# Patient Record
Sex: FEMALE | Race: ASIAN | NOT HISPANIC OR LATINO | Employment: FULL TIME | URBAN - METROPOLITAN AREA
[De-identification: names, ages, dates, MRNs, and addresses within clinical notes are randomized per-mention and may not be internally consistent; named-entity substitution may affect disease eponyms.]

---

## 2017-08-03 ENCOUNTER — OFFICE VISIT (OUTPATIENT)
Dept: HEMATOLOGY/ONCOLOGY | Facility: CLINIC | Age: 52
End: 2017-08-03
Payer: COMMERCIAL

## 2017-08-03 VITALS
DIASTOLIC BLOOD PRESSURE: 65 MMHG | BODY MASS INDEX: 26.09 KG/M2 | HEIGHT: 62 IN | WEIGHT: 141.81 LBS | RESPIRATION RATE: 18 BRPM | HEART RATE: 56 BPM | TEMPERATURE: 98 F | SYSTOLIC BLOOD PRESSURE: 108 MMHG

## 2017-08-03 DIAGNOSIS — C50.412 MALIGNANT NEOPLASM OF UPPER-OUTER QUADRANT OF LEFT BREAST IN FEMALE, ESTROGEN RECEPTOR NEGATIVE: Chronic | ICD-10-CM

## 2017-08-03 DIAGNOSIS — Z17.1 MALIGNANT NEOPLASM OF UPPER-OUTER QUADRANT OF LEFT BREAST IN FEMALE, ESTROGEN RECEPTOR NEGATIVE: Chronic | ICD-10-CM

## 2017-08-03 PROCEDURE — 99213 OFFICE O/P EST LOW 20 MIN: CPT | Mod: ,,, | Performed by: INTERNAL MEDICINE

## 2017-08-03 PROCEDURE — 3008F BODY MASS INDEX DOCD: CPT | Mod: ,,, | Performed by: INTERNAL MEDICINE

## 2017-08-03 NOTE — PROGRESS NOTES
"                                                         PROGRESS NOTE    Subjective:       Patient ID: Jordan Lion is a 52 y.o. female.  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     History of Present Illness:   Jordan Lion is a 52 y.o. female who presents for routine follow up of breast cancer.        Family and Social history reviewed and is unchanged from 3/30/2015.       ROS:  Review of Systems   Constitutional: Negative for fever.   Respiratory: Negative for shortness of breath.    Cardiovascular: Negative for chest pain and leg swelling.   Gastrointestinal: Negative for abdominal pain and blood in stool.   Genitourinary: Negative for hematuria.   Skin: Negative for rash.          Current Outpatient Prescriptions:     multivitamin (ONE DAILY MULTIVITAMIN) per tablet, Take 1 tablet by mouth once daily., Disp: , Rfl:     oxycodone-acetaminophen (PERCOCET) 5-325 mg per tablet, Take 2 tablets by mouth every 4 (four) hours as needed for Pain., Disp: 31 tablet, Rfl: 0    pantoprazole (PROTONIX) 40 MG tablet, Take 40 mg by mouth once daily., Disp: , Rfl:         Objective:       Physical Examination:     /65   Pulse (!) 56   Temp 98.4 °F (36.9 °C)   Resp 18   Ht 5' 2" (1.575 m)   Wt 64.3 kg (141 lb 12.8 oz)   BMI 25.94 kg/m²     Physical Exam   Constitutional: She appears well-developed and well-nourished.   HENT:   Head: Normocephalic and atraumatic.   Right Ear: External ear normal.   Left Ear: External ear normal.   Mouth/Throat: Oropharynx is clear and moist.   Eyes: Conjunctivae are normal. Pupils are equal, round, and reactive to light.   Neck: No tracheal deviation present. No thyromegaly present.   Cardiovascular: Normal rate, regular rhythm and normal heart sounds.    Pulmonary/Chest: Effort normal and breath sounds " normal.       Abdominal: Soft. Bowel sounds are normal. She exhibits no distension and no mass. There is no tenderness.   Musculoskeletal: She exhibits no edema.   Neurological:   Neuro intact througout   Skin: No rash noted.   Psychiatric: She has a normal mood and affect. Her behavior is normal. Judgment and thought content normal.       Labs:   No results found for this or any previous visit (from the past 336 hour(s)).  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  Exam is negative and pt.is INDIO.  Will continue every 6 month follow up for exams.               Other Visit Diagnoses    None.         Discussion:     Return in about 6 months (around 2/3/2018).      Electronically signed by Chase Colon

## 2017-08-03 NOTE — LETTER
August 3, 2017      Brayan Devlin MD  [address]  [place] [address]           [facility]  [address]  [address]  [place] [address]  Phone: [phone]  Fax: [phone]          Patient: Jordan Lion   MR Number: [id number]   YOB: 1965   Date of Visit: 8/3/2017       Dear Dr. Brayan Devlin:    Thank you for referring Jordan Lion to me for evaluation. Attached you will find relevant portions of my assessment and plan of care.    If you have questions, please do not hesitate to call me. I look forward to following Jordan Lion along with you.    Sincerely,    Chase Morales MD    Enclosure  CC:  No Recipients    If you would like to receive this communication electronically, please contact [email or web address] or [phone] to request more information on [employer] Link access.    For providers and/or their staff who would like to refer a patient to [facility], please contact us through our one-stop-shop provider referral line, [facility], at [phone].    If you feel you have received this communication in error or would no longer like to receive these types of communications, please e-mail [email or web address]

## 2017-08-03 NOTE — ASSESSMENT & PLAN NOTE
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  Exam is negative and pt.is INDIO.  Will continue every 6 month follow up for exams.

## 2018-02-05 ENCOUNTER — OFFICE VISIT (OUTPATIENT)
Dept: HEMATOLOGY/ONCOLOGY | Facility: CLINIC | Age: 53
End: 2018-02-05
Payer: COMMERCIAL

## 2018-02-05 VITALS
BODY MASS INDEX: 26.53 KG/M2 | DIASTOLIC BLOOD PRESSURE: 64 MMHG | TEMPERATURE: 98 F | WEIGHT: 144.19 LBS | HEIGHT: 62 IN | HEART RATE: 62 BPM | SYSTOLIC BLOOD PRESSURE: 92 MMHG | RESPIRATION RATE: 18 BRPM

## 2018-02-05 DIAGNOSIS — Z17.1 MALIGNANT NEOPLASM OF UPPER-OUTER QUADRANT OF LEFT BREAST IN FEMALE, ESTROGEN RECEPTOR NEGATIVE: Chronic | ICD-10-CM

## 2018-02-05 DIAGNOSIS — C50.412 MALIGNANT NEOPLASM OF UPPER-OUTER QUADRANT OF LEFT BREAST IN FEMALE, ESTROGEN RECEPTOR NEGATIVE: Chronic | ICD-10-CM

## 2018-02-05 PROCEDURE — 99213 OFFICE O/P EST LOW 20 MIN: CPT | Mod: ,,, | Performed by: INTERNAL MEDICINE

## 2018-02-05 PROCEDURE — 3008F BODY MASS INDEX DOCD: CPT | Mod: ,,, | Performed by: INTERNAL MEDICINE

## 2018-02-05 NOTE — PROGRESS NOTES
"                                                         PROGRESS NOTE    Subjective:       Patient ID: Jordan Lion is a 52 y.o. female.  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     History of Present Illness:   Jordan Lion is a 52 y.o. female who presents for routine follow up of breast cancer.    No new complaints today.     Family and Social history reviewed and is unchanged from 3/30/2015.       ROS:  Review of Systems   Constitutional: Negative for fever.   Respiratory: Negative for shortness of breath.    Cardiovascular: Negative for chest pain and leg swelling.   Gastrointestinal: Negative for abdominal pain and blood in stool.   Genitourinary: Negative for hematuria.   Skin: Negative for rash.          Current Outpatient Prescriptions:     multivitamin (ONE DAILY MULTIVITAMIN) per tablet, Take 1 tablet by mouth once daily., Disp: , Rfl:     oxycodone-acetaminophen (PERCOCET) 5-325 mg per tablet, Take 2 tablets by mouth every 4 (four) hours as needed for Pain., Disp: 31 tablet, Rfl: 0    pantoprazole (PROTONIX) 40 MG tablet, Take 40 mg by mouth once daily., Disp: , Rfl:         Objective:       Physical Examination:     BP 92/64   Pulse 62   Temp 97.9 °F (36.6 °C)   Resp 18   Ht 5' 2" (1.575 m)   Wt 65.4 kg (144 lb 3.2 oz)   BMI 26.37 kg/m²     Physical Exam   Constitutional: She appears well-developed and well-nourished.   HENT:   Head: Normocephalic and atraumatic.   Right Ear: External ear normal.   Left Ear: External ear normal.   Mouth/Throat: Oropharynx is clear and moist.   Eyes: Conjunctivae are normal. Pupils are equal, round, and reactive to light.   Neck: No tracheal deviation present. No thyromegaly present.   Cardiovascular: Normal rate, regular rhythm and normal heart sounds.    Pulmonary/Chest: Effort normal and breath " sounds normal.       Abdominal: Soft. Bowel sounds are normal. She exhibits no distension and no mass. There is no tenderness.   Musculoskeletal: She exhibits no edema.   Neurological:   Neuro intact througout   Skin: No rash noted.   Psychiatric: She has a normal mood and affect. Her behavior is normal. Judgment and thought content normal.       Labs:   No results found for this or any previous visit (from the past 336 hour(s)).  CMP  No results found for: NA, K, CL, CO2, GLU, BUN, CREATININE, CALCIUM, PROT, ALBUMIN, BILITOT, ALKPHOS, AST, ALT, ANIONGAP, ESTGFRAFRICA, EGFRNONAA  No results found for: CEA  No results found for: PSA        Assessment/Plan:     Problem List Items Addressed This Visit     Malignant neoplasm of upper-outer quadrant of left breast in female, estrogen receptor negative (Chronic)     Patient is doing well and is INDIO at this time.  She had high grade DCIS with suspicion for microinvasion.  Will continue with every six month follow up.                 Discussion:     Follow-up in about 6 months (around 8/5/2018).      Electronically signed by Chsae Colon

## 2018-02-05 NOTE — ASSESSMENT & PLAN NOTE
Patient is doing well and is INDIO at this time.  She had high grade DCIS with suspicion for microinvasion.  Will continue with every six month follow up.

## 2018-02-05 NOTE — LETTER
February 5, 2018      Brayan Devlin MD  [address]  [place] [address]           [facility]  [address]  [address]  [place] [address]  Phone: [phone]  Fax: [phone]          Patient: Jordan Lion   MR Number: [id number]   YOB: 1965   Date of Visit: 2/5/2018       Dear Dr. Brayan Devlin:    Thank you for referring Jordan Lion to me for evaluation. Attached you will find relevant portions of my assessment and plan of care.    If you have questions, please do not hesitate to call me. I look forward to following Jordan Lion along with you.    Sincerely,    Chase Morales MD    Enclosure  CC:  No Recipients    If you would like to receive this communication electronically, please contact [email or web address] or [phone] to request more information on [employer] Link access.    For providers and/or their staff who would like to refer a patient to [facility], please contact us through our one-stop-shop provider referral line, [facility], at [phone].    If you feel you have received this communication in error or would no longer like to receive these types of communications, please e-mail [email or web address]

## 2018-09-04 ENCOUNTER — OFFICE VISIT (OUTPATIENT)
Dept: HEMATOLOGY/ONCOLOGY | Facility: CLINIC | Age: 53
End: 2018-09-04
Payer: COMMERCIAL

## 2018-09-04 VITALS
BODY MASS INDEX: 26.13 KG/M2 | SYSTOLIC BLOOD PRESSURE: 102 MMHG | TEMPERATURE: 99 F | HEART RATE: 60 BPM | WEIGHT: 142 LBS | HEIGHT: 62 IN | DIASTOLIC BLOOD PRESSURE: 58 MMHG | RESPIRATION RATE: 18 BRPM

## 2018-09-04 DIAGNOSIS — Z17.1 MALIGNANT NEOPLASM OF UPPER-OUTER QUADRANT OF LEFT BREAST IN FEMALE, ESTROGEN RECEPTOR NEGATIVE: Chronic | ICD-10-CM

## 2018-09-04 DIAGNOSIS — C50.412 MALIGNANT NEOPLASM OF UPPER-OUTER QUADRANT OF LEFT BREAST IN FEMALE, ESTROGEN RECEPTOR NEGATIVE: Chronic | ICD-10-CM

## 2018-09-04 PROCEDURE — 3008F BODY MASS INDEX DOCD: CPT | Mod: ,,, | Performed by: INTERNAL MEDICINE

## 2018-09-04 PROCEDURE — 99213 OFFICE O/P EST LOW 20 MIN: CPT | Mod: ,,, | Performed by: INTERNAL MEDICINE

## 2018-09-04 NOTE — PROGRESS NOTES
"                                                         PROGRESS NOTE    Subjective:       Patient ID: Jordan Lion is a 53 y.o. female.  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  f/u breast cancer.     History of Present Illness:   Jordan Lion is a 53 y.o. female who presents for routine follow up of breast cancer.    No new complaints today. patient is feeling well.      Family and Social history reviewed and is unchanged from 3/30/2015.       ROS:  Review of Systems   Constitutional: Negative for fever.   Respiratory: Negative for shortness of breath.    Cardiovascular: Negative for chest pain and leg swelling.   Gastrointestinal: Negative for abdominal pain and blood in stool.   Genitourinary: Negative for hematuria.   Skin: Negative for rash.          Current Outpatient Medications:     multivitamin (ONE DAILY MULTIVITAMIN) per tablet, Take 1 tablet by mouth once daily., Disp: , Rfl:     oxycodone-acetaminophen (PERCOCET) 5-325 mg per tablet, Take 2 tablets by mouth every 4 (four) hours as needed for Pain., Disp: 31 tablet, Rfl: 0    pantoprazole (PROTONIX) 40 MG tablet, Take 40 mg by mouth once daily., Disp: , Rfl:         Objective:       Physical Examination:     BP (!) 102/58   Pulse 60   Temp 98.9 °F (37.2 °C) (Oral)   Resp 18   Ht 5' 2" (1.575 m)   Wt 64.4 kg (142 lb)   BMI 25.97 kg/m²     Physical Exam   Constitutional: She appears well-developed and well-nourished.   HENT:   Head: Normocephalic and atraumatic.   Right Ear: External ear normal.   Left Ear: External ear normal.   Mouth/Throat: Oropharynx is clear and moist.   Eyes: Conjunctivae are normal. Pupils are equal, round, and reactive to light.   Neck: No tracheal deviation present. No thyromegaly present.   Cardiovascular: Normal rate, regular rhythm and normal heart sounds.   Pulmonary/Chest: Effort " normal and breath sounds normal.       Abdominal: Soft. Bowel sounds are normal. She exhibits no distension and no mass. There is no tenderness.   Musculoskeletal: She exhibits no edema.   Neurological:   Neuro intact througout   Skin: No rash noted.   Psychiatric: She has a normal mood and affect. Her behavior is normal. Judgment and thought content normal.       Labs:   No results found for this or any previous visit (from the past 336 hour(s)).  CMP  No results found for: NA, K, CL, CO2, GLU, BUN, CREATININE, CALCIUM, PROT, ALBUMIN, BILITOT, ALKPHOS, AST, ALT, ANIONGAP, ESTGFRAFRICA, EGFRNONAA  No results found for: CEA  No results found for: PSA        Assessment/Plan:     Problem List Items Addressed This Visit     Malignant neoplasm of upper-outer quadrant of left breast in female, estrogen receptor negative (Chronic)     Patient is doing well.  Exam is negative and she is currently INDIO.  Will continue with every six month follow up and discussed this today.                 Discussion:     Follow-up in about 6 months (around 3/4/2019).      Electronically signed by Chase Colon

## 2019-03-07 ENCOUNTER — OFFICE VISIT (OUTPATIENT)
Dept: HEMATOLOGY/ONCOLOGY | Facility: CLINIC | Age: 54
End: 2019-03-07
Payer: COMMERCIAL

## 2019-03-07 VITALS
SYSTOLIC BLOOD PRESSURE: 106 MMHG | DIASTOLIC BLOOD PRESSURE: 57 MMHG | RESPIRATION RATE: 20 BRPM | BODY MASS INDEX: 25.75 KG/M2 | TEMPERATURE: 98 F | WEIGHT: 140.81 LBS | HEART RATE: 57 BPM

## 2019-03-07 DIAGNOSIS — Z17.1 MALIGNANT NEOPLASM OF UPPER-OUTER QUADRANT OF LEFT BREAST IN FEMALE, ESTROGEN RECEPTOR NEGATIVE: Chronic | ICD-10-CM

## 2019-03-07 DIAGNOSIS — C50.412 MALIGNANT NEOPLASM OF UPPER-OUTER QUADRANT OF LEFT BREAST IN FEMALE, ESTROGEN RECEPTOR NEGATIVE: Chronic | ICD-10-CM

## 2019-03-07 PROCEDURE — 99213 OFFICE O/P EST LOW 20 MIN: CPT | Mod: ,,, | Performed by: INTERNAL MEDICINE

## 2019-03-07 PROCEDURE — 99213 PR OFFICE/OUTPT VISIT, EST, LEVL III, 20-29 MIN: ICD-10-PCS | Mod: ,,, | Performed by: INTERNAL MEDICINE

## 2019-03-07 PROCEDURE — 3008F PR BODY MASS INDEX (BMI) DOCUMENTED: ICD-10-PCS | Mod: ,,, | Performed by: INTERNAL MEDICINE

## 2019-03-07 PROCEDURE — 3008F BODY MASS INDEX DOCD: CPT | Mod: ,,, | Performed by: INTERNAL MEDICINE

## 2019-03-07 NOTE — PROGRESS NOTES
PROGRESS NOTE    Subjective:       Patient ID: Jordan Lion is a 53 y.o. female.  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     History of Present Illness:   Jordan Lion is a 53 y.o. female who presents for routine follow up of breast cancer.    Patient is doing well without new complaints.       Family and Social history reviewed and is unchanged from 3/30/2015.       ROS:  Review of Systems   Constitutional: Negative for fever.   Respiratory: Negative for shortness of breath.    Cardiovascular: Negative for chest pain and leg swelling.   Gastrointestinal: Negative for abdominal pain and blood in stool.   Genitourinary: Negative for hematuria.   Skin: Negative for rash.          Current Outpatient Medications:     multivitamin (ONE DAILY MULTIVITAMIN) per tablet, Take 1 tablet by mouth once daily., Disp: , Rfl:     oxycodone-acetaminophen (PERCOCET) 5-325 mg per tablet, Take 2 tablets by mouth every 4 (four) hours as needed for Pain., Disp: 31 tablet, Rfl: 0    pantoprazole (PROTONIX) 40 MG tablet, Take 40 mg by mouth once daily., Disp: , Rfl:         Objective:       Physical Examination:     BP (!) 106/57   Pulse (!) 57   Temp 97.8 °F (36.6 °C)   Resp 20   Wt 63.9 kg (140 lb 12.8 oz)   BMI 25.75 kg/m²     Physical Exam   Constitutional: She appears well-developed and well-nourished.   HENT:   Head: Normocephalic and atraumatic.   Right Ear: External ear normal.   Left Ear: External ear normal.   Mouth/Throat: Oropharynx is clear and moist.   Eyes: Conjunctivae are normal. Pupils are equal, round, and reactive to light.   Neck: No tracheal deviation present. No thyromegaly present.   Cardiovascular: Normal rate, regular rhythm and normal heart sounds.   Pulmonary/Chest: Effort normal and breath sounds  normal.       Abdominal: Soft. Bowel sounds are normal. She exhibits no distension and no mass. There is no tenderness.   Musculoskeletal: She exhibits no edema.   Neurological:   Neuro intact througout   Skin: No rash noted.   Psychiatric: She has a normal mood and affect. Her behavior is normal. Judgment and thought content normal.       Labs:   No results found for this or any previous visit (from the past 336 hour(s)).  CMP  No results found for: NA, K, CL, CO2, GLU, BUN, CREATININE, CALCIUM, PROT, ALBUMIN, BILITOT, ALKPHOS, AST, ALT, ANIONGAP, ESTGFRAFRICA, EGFRNONAA  No results found for: CEA  No results found for: PSA        Assessment/Plan:     Problem List Items Addressed This Visit     Malignant neoplasm of upper-outer quadrant of left breast in female, estrogen receptor negative (Chronic)     Patient is doing well today.  No new issues and exam is negative.  Will continue to monitor patient on a six month basis and discussed this today.                 Discussion:     Follow-up in about 6 months (around 9/7/2019).      Electronically signed by Chase Colon       no neck pain/no back pain

## 2019-03-07 NOTE — ASSESSMENT & PLAN NOTE
Patient is doing well today.  No new issues and exam is negative.  Will continue to monitor patient on a six month basis and discussed this today.

## 2019-03-07 NOTE — LETTER
March 7, 2019      Brayan Devlin MD  [address]  [place] [address]           [facility]  [address]  [address]  [place] [address]  Phone: [phone]  Fax: [phone]          Patient: Jordan Lion   MR Number: [id number]   YOB: 1965   Date of Visit: 3/7/2019       Dear Dr. Brayan Devlin:    Thank you for referring Jordan Lion to me for evaluation. Attached you will find relevant portions of my assessment and plan of care.    If you have questions, please do not hesitate to call me. I look forward to following Jordan Lion along with you.    Sincerely,    Chase Morales MD    Enclosure  CC:  No Recipients    If you would like to receive this communication electronically, please contact [email or web address] or [phone] to request more information on [employer] Link access.    For providers and/or their staff who would like to refer a patient to [facility], please contact us through our one-stop-shop provider referral line, [facility], at [phone].    If you feel you have received this communication in error or would no longer like to receive these types of communications, please e-mail [email or web address]

## 2019-09-09 ENCOUNTER — OFFICE VISIT (OUTPATIENT)
Dept: HEMATOLOGY/ONCOLOGY | Facility: CLINIC | Age: 54
End: 2019-09-09
Payer: COMMERCIAL

## 2019-09-09 VITALS
WEIGHT: 140.31 LBS | RESPIRATION RATE: 20 BRPM | HEART RATE: 66 BPM | TEMPERATURE: 99 F | SYSTOLIC BLOOD PRESSURE: 106 MMHG | DIASTOLIC BLOOD PRESSURE: 67 MMHG | BODY MASS INDEX: 25.66 KG/M2

## 2019-09-09 DIAGNOSIS — C50.412 MALIGNANT NEOPLASM OF UPPER-OUTER QUADRANT OF LEFT BREAST IN FEMALE, ESTROGEN RECEPTOR NEGATIVE: Chronic | ICD-10-CM

## 2019-09-09 DIAGNOSIS — Z17.1 MALIGNANT NEOPLASM OF UPPER-OUTER QUADRANT OF LEFT BREAST IN FEMALE, ESTROGEN RECEPTOR NEGATIVE: Chronic | ICD-10-CM

## 2019-09-09 PROCEDURE — 99213 OFFICE O/P EST LOW 20 MIN: CPT | Mod: S$GLB,,, | Performed by: INTERNAL MEDICINE

## 2019-09-09 PROCEDURE — 3008F PR BODY MASS INDEX (BMI) DOCUMENTED: ICD-10-PCS | Mod: S$GLB,,, | Performed by: INTERNAL MEDICINE

## 2019-09-09 PROCEDURE — 3008F BODY MASS INDEX DOCD: CPT | Mod: S$GLB,,, | Performed by: INTERNAL MEDICINE

## 2019-09-09 PROCEDURE — 99213 PR OFFICE/OUTPT VISIT, EST, LEVL III, 20-29 MIN: ICD-10-PCS | Mod: S$GLB,,, | Performed by: INTERNAL MEDICINE

## 2019-09-09 RX ORDER — CHOLECALCIFEROL (VITAMIN D3) 25 MCG
1000 TABLET ORAL DAILY
COMMUNITY

## 2019-09-09 RX ORDER — MULTIVIT WITH MINERALS/HERBS
1 TABLET ORAL DAILY
COMMUNITY

## 2019-09-09 NOTE — PROGRESS NOTES
PROGRESS NOTE    Subjective:       Patient ID: Jordan Lion is a 54 y.o. female.    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  f/u breast cancer.     History of Present Illness:   Jordan Lion is a 54 y.o. female who presents for routine follow up of breast cancer.    Patient is doing well without new complaints.      Labs not done this visit.      Family and Social history reviewed and is unchanged from 3/30/2015.       ROS:  Review of Systems   Constitutional: Negative for fever.   Respiratory: Negative for shortness of breath.    Cardiovascular: Negative for chest pain and leg swelling.   Gastrointestinal: Negative for abdominal pain and blood in stool.   Genitourinary: Negative for hematuria.   Skin: Negative for rash.          Current Outpatient Medications:     b complex vitamins tablet, Take 1 tablet by mouth once daily., Disp: , Rfl:     gluc carvalho/chondro carvalho A/vit C/Mn (GLUCOSAMINE 1500 COMPLEX ORAL), Take by mouth., Disp: , Rfl:     vitamin D (VITAMIN D3) 1000 units Tab, Take 1,000 Units by mouth once daily., Disp: , Rfl:         Objective:       Physical Examination:     /67   Pulse 66   Temp 98.6 °F (37 °C) (Oral)   Resp 20   Wt 63.6 kg (140 lb 4.8 oz)   BMI 25.66 kg/m²     Physical Exam   Constitutional: She appears well-developed and well-nourished.   HENT:   Head: Normocephalic and atraumatic.   Right Ear: External ear normal.   Left Ear: External ear normal.   Mouth/Throat: Oropharynx is clear and moist.   Eyes: Pupils are equal, round, and reactive to light. Conjunctivae are normal.   Neck: No tracheal deviation present. No thyromegaly present.   Cardiovascular: Normal rate, regular rhythm and normal heart sounds.   Pulmonary/Chest: Effort normal and breath sounds normal.       Abdominal: Soft. Bowel  sounds are normal. She exhibits no distension and no mass. There is no tenderness.   Musculoskeletal: She exhibits no edema.   Neurological:   Neuro intact througout   Skin: No rash noted.   Psychiatric: She has a normal mood and affect. Her behavior is normal. Judgment and thought content normal.       Labs:   No results found for this or any previous visit (from the past 336 hour(s)).  CMP  No results found for: NA, K, CL, CO2, GLU, BUN, CREATININE, CALCIUM, PROT, ALBUMIN, BILITOT, ALKPHOS, AST, ALT, ANIONGAP, ESTGFRAFRICA, EGFRNONAA  No results found for: CEA  No results found for: PSA        Assessment/Plan:     Problem List Items Addressed This Visit     Malignant neoplasm of upper-outer quadrant of left breast in female, estrogen receptor negative (Chronic)     Patient is doing well at this time.  Did not get blood work this visit but will have these done for pcp in a few months.  Exam is negative and she appears INDIO at this time.  Will continue every six month follow up.                 Discussion:     Follow up in about 6 months (around 3/9/2020).      Electronically signed by Chase Colon

## 2019-09-09 NOTE — ASSESSMENT & PLAN NOTE
Patient is doing well at this time.  Did not get blood work this visit but will have these done for pcp in a few months.  Exam is negative and she appears INDIO at this time.  Will continue every six month follow up.

## 2019-12-03 ENCOUNTER — LAB VISIT (OUTPATIENT)
Dept: LAB | Facility: HOSPITAL | Age: 54
End: 2019-12-03
Attending: INTERNAL MEDICINE
Payer: COMMERCIAL

## 2019-12-03 DIAGNOSIS — E78.2 MIXED HYPERLIPIDEMIA: ICD-10-CM

## 2019-12-03 DIAGNOSIS — R10.11 ABDOMINAL PAIN, RIGHT UPPER QUADRANT: Primary | ICD-10-CM

## 2019-12-03 LAB
ALBUMIN SERPL BCP-MCNC: 4.2 G/DL (ref 3.5–5.2)
ALP SERPL-CCNC: 67 U/L (ref 55–135)
ALT SERPL W/O P-5'-P-CCNC: 22 U/L (ref 10–44)
ANION GAP SERPL CALC-SCNC: 8 MMOL/L (ref 8–16)
AST SERPL-CCNC: 21 U/L (ref 10–40)
BILIRUB SERPL-MCNC: 0.9 MG/DL (ref 0.1–1)
BUN SERPL-MCNC: 15 MG/DL (ref 6–20)
CALCIUM SERPL-MCNC: 9.1 MG/DL (ref 8.7–10.5)
CHLORIDE SERPL-SCNC: 105 MMOL/L (ref 95–110)
CHOLEST SERPL-MCNC: 187 MG/DL (ref 120–199)
CHOLEST/HDLC SERPL: 3.8 {RATIO} (ref 2–5)
CO2 SERPL-SCNC: 28 MMOL/L (ref 23–29)
CREAT SERPL-MCNC: 0.7 MG/DL (ref 0.5–1.4)
EST. GFR  (AFRICAN AMERICAN): >60 ML/MIN/1.73 M^2
EST. GFR  (NON AFRICAN AMERICAN): >60 ML/MIN/1.73 M^2
GLUCOSE SERPL-MCNC: 90 MG/DL (ref 70–110)
HDLC SERPL-MCNC: 49 MG/DL (ref 40–75)
HDLC SERPL: 26.2 % (ref 20–50)
LDLC SERPL CALC-MCNC: 110.8 MG/DL (ref 63–159)
NONHDLC SERPL-MCNC: 138 MG/DL
POTASSIUM SERPL-SCNC: 3.8 MMOL/L (ref 3.5–5.1)
PROT SERPL-MCNC: 7.1 G/DL (ref 6–8.4)
SODIUM SERPL-SCNC: 141 MMOL/L (ref 136–145)
TRIGL SERPL-MCNC: 136 MG/DL (ref 30–150)

## 2019-12-03 PROCEDURE — 80061 LIPID PANEL: CPT

## 2019-12-03 PROCEDURE — 36415 COLL VENOUS BLD VENIPUNCTURE: CPT

## 2019-12-03 PROCEDURE — 80053 COMPREHEN METABOLIC PANEL: CPT

## 2020-08-06 ENCOUNTER — OFFICE VISIT (OUTPATIENT)
Dept: HEMATOLOGY/ONCOLOGY | Facility: CLINIC | Age: 55
End: 2020-08-06
Payer: COMMERCIAL

## 2020-08-06 VITALS
BODY MASS INDEX: 25.06 KG/M2 | HEART RATE: 62 BPM | RESPIRATION RATE: 18 BRPM | DIASTOLIC BLOOD PRESSURE: 71 MMHG | TEMPERATURE: 98 F | SYSTOLIC BLOOD PRESSURE: 115 MMHG | WEIGHT: 137 LBS

## 2020-08-06 DIAGNOSIS — Z17.1 MALIGNANT NEOPLASM OF UPPER-OUTER QUADRANT OF LEFT BREAST IN FEMALE, ESTROGEN RECEPTOR NEGATIVE: Chronic | ICD-10-CM

## 2020-08-06 DIAGNOSIS — C50.412 MALIGNANT NEOPLASM OF UPPER-OUTER QUADRANT OF LEFT BREAST IN FEMALE, ESTROGEN RECEPTOR NEGATIVE: Chronic | ICD-10-CM

## 2020-08-06 PROCEDURE — 99213 OFFICE O/P EST LOW 20 MIN: CPT | Mod: S$GLB,,, | Performed by: INTERNAL MEDICINE

## 2020-08-06 PROCEDURE — 3008F BODY MASS INDEX DOCD: CPT | Mod: S$GLB,,, | Performed by: INTERNAL MEDICINE

## 2020-08-06 PROCEDURE — 99213 PR OFFICE/OUTPT VISIT, EST, LEVL III, 20-29 MIN: ICD-10-PCS | Mod: S$GLB,,, | Performed by: INTERNAL MEDICINE

## 2020-08-06 PROCEDURE — 3008F PR BODY MASS INDEX (BMI) DOCUMENTED: ICD-10-PCS | Mod: S$GLB,,, | Performed by: INTERNAL MEDICINE

## 2020-08-06 NOTE — PROGRESS NOTES
PROGRESS NOTE    Subjective:       Patient ID: Jordan Lion is a 55 y.o. female.    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  f/u breast cancer.     History of Present Illness:   Jordan Lion is a 55 y.o. female who presents for routine follow up of breast cancer.        Patient is feeling well at this time without new complaints.     Family and Social history reviewed and is unchanged from 3/30/2015.       ROS:  Review of Systems   Constitutional: Negative for fever.   Respiratory: Negative for shortness of breath.    Cardiovascular: Negative for chest pain and leg swelling.   Gastrointestinal: Negative for abdominal pain and blood in stool.   Genitourinary: Negative for hematuria.   Skin: Negative for rash.          Current Outpatient Medications:     b complex vitamins tablet, Take 1 tablet by mouth once daily., Disp: , Rfl:     gluc carvalho/chondro carvalho A/vit C/Mn (GLUCOSAMINE 1500 COMPLEX ORAL), Take by mouth., Disp: , Rfl:     vitamin D (VITAMIN D3) 1000 units Tab, Take 1,000 Units by mouth once daily., Disp: , Rfl:         Objective:       Physical Examination:     /71   Pulse 62   Temp 98.3 °F (36.8 °C)   Resp 18   Wt 62.1 kg (137 lb)   BMI 25.06 kg/m²     Physical Exam  Constitutional:       Appearance: She is well-developed.   HENT:      Head: Normocephalic and atraumatic.      Right Ear: External ear normal.      Left Ear: External ear normal.   Eyes:      Conjunctiva/sclera: Conjunctivae normal.      Pupils: Pupils are equal, round, and reactive to light.   Neck:      Thyroid: No thyromegaly.      Trachea: No tracheal deviation.   Cardiovascular:      Rate and Rhythm: Normal rate and regular rhythm.      Heart sounds: Normal heart sounds.   Pulmonary:      Effort: Pulmonary effort is normal.      Breath  sounds: Normal breath sounds.   Chest:       Abdominal:      General: Bowel sounds are normal. There is no distension.      Palpations: Abdomen is soft. There is no mass.      Tenderness: There is no abdominal tenderness.   Skin:     Findings: No rash.   Neurological:      Comments: Neuro intact througout   Psychiatric:         Behavior: Behavior normal.         Thought Content: Thought content normal.         Judgment: Judgment normal.         Labs:   No results found for this or any previous visit (from the past 336 hour(s)).  CMP  Sodium   Date Value Ref Range Status   12/03/2019 141 136 - 145 mmol/L Final     Potassium   Date Value Ref Range Status   12/03/2019 3.8 3.5 - 5.1 mmol/L Final     Chloride   Date Value Ref Range Status   12/03/2019 105 95 - 110 mmol/L Final     CO2   Date Value Ref Range Status   12/03/2019 28 23 - 29 mmol/L Final     Glucose   Date Value Ref Range Status   12/03/2019 90 70 - 110 mg/dL Final     BUN, Bld   Date Value Ref Range Status   12/03/2019 15 6 - 20 mg/dL Final     Creatinine   Date Value Ref Range Status   12/03/2019 0.7 0.5 - 1.4 mg/dL Final     Calcium   Date Value Ref Range Status   12/03/2019 9.1 8.7 - 10.5 mg/dL Final     Total Protein   Date Value Ref Range Status   12/03/2019 7.1 6.0 - 8.4 g/dL Final     Albumin   Date Value Ref Range Status   12/03/2019 4.2 3.5 - 5.2 g/dL Final     Total Bilirubin   Date Value Ref Range Status   12/03/2019 0.9 0.1 - 1.0 mg/dL Final     Comment:     For infants and newborns, interpretation of results should be based  on gestational age, weight and in agreement with clinical  observations.  Premature Infant recommended reference ranges:  Up to 24 hours.............<8.0 mg/dL  Up to 48 hours............<12.0 mg/dL  3-5 days..................<15.0 mg/dL  6-29 days.................<15.0 mg/dL       Alkaline Phosphatase   Date Value Ref Range Status   12/03/2019 67 55 - 135 U/L Final     AST   Date Value Ref Range Status   12/03/2019 21 10 -  40 U/L Final     ALT   Date Value Ref Range Status   12/03/2019 22 10 - 44 U/L Final     Anion Gap   Date Value Ref Range Status   12/03/2019 8 8 - 16 mmol/L Final     eGFR if    Date Value Ref Range Status   12/03/2019 >60.0 >60 mL/min/1.73 m^2 Final     eGFR if non    Date Value Ref Range Status   12/03/2019 >60.0 >60 mL/min/1.73 m^2 Final     Comment:     Calculation used to obtain the estimated glomerular filtration  rate (eGFR) is the CKD-EPI equation.        No results found for: CEA  No results found for: PSA        Assessment/Plan:     Problem List Items Addressed This Visit     Malignant neoplasm of upper-outer quadrant of left breast in female, estrogen receptor negative (Chronic)     Patient is doing well at this time and appears INDIO.  She has had bilateral mastectomy and cancer was very small.  Will continue to see her on a six month basis and discussed this today.                 Discussion:     Follow up in about 6 months (around 2/6/2021).      Electronically signed by Chase Colon

## 2020-08-06 NOTE — ASSESSMENT & PLAN NOTE
Patient is doing well at this time and appears INDIO.  She has had bilateral mastectomy and cancer was very small.  Will continue to see her on a six month basis and discussed this today.

## 2020-12-23 ENCOUNTER — LAB VISIT (OUTPATIENT)
Dept: LAB | Facility: HOSPITAL | Age: 55
End: 2020-12-23
Attending: INTERNAL MEDICINE
Payer: COMMERCIAL

## 2020-12-23 DIAGNOSIS — E78.2 MIXED HYPERLIPIDEMIA: ICD-10-CM

## 2020-12-23 DIAGNOSIS — R10.11 ABDOMINAL PAIN, RIGHT UPPER QUADRANT: Primary | ICD-10-CM

## 2020-12-23 DIAGNOSIS — E03.4 IDIOPATHIC ATROPHIC HYPOTHYROIDISM: ICD-10-CM

## 2020-12-23 LAB
ALBUMIN SERPL BCP-MCNC: 4.2 G/DL (ref 3.5–5.2)
ALP SERPL-CCNC: 67 U/L (ref 55–135)
ALT SERPL W/O P-5'-P-CCNC: 16 U/L (ref 10–44)
ANION GAP SERPL CALC-SCNC: 8 MMOL/L (ref 8–16)
AST SERPL-CCNC: 21 U/L (ref 10–40)
BILIRUB SERPL-MCNC: 0.6 MG/DL (ref 0.1–1)
BUN SERPL-MCNC: 15 MG/DL (ref 6–20)
CALCIUM SERPL-MCNC: 9.1 MG/DL (ref 8.7–10.5)
CHLORIDE SERPL-SCNC: 107 MMOL/L (ref 95–110)
CHOLEST SERPL-MCNC: 189 MG/DL (ref 120–199)
CHOLEST/HDLC SERPL: 4 {RATIO} (ref 2–5)
CO2 SERPL-SCNC: 26 MMOL/L (ref 23–29)
CREAT SERPL-MCNC: 0.6 MG/DL (ref 0.5–1.4)
EST. GFR  (AFRICAN AMERICAN): >60 ML/MIN/1.73 M^2
EST. GFR  (NON AFRICAN AMERICAN): >60 ML/MIN/1.73 M^2
GLUCOSE SERPL-MCNC: 94 MG/DL (ref 70–110)
HDLC SERPL-MCNC: 47 MG/DL (ref 40–75)
HDLC SERPL: 24.9 % (ref 20–50)
LDLC SERPL CALC-MCNC: 110.8 MG/DL (ref 63–159)
NONHDLC SERPL-MCNC: 142 MG/DL
POTASSIUM SERPL-SCNC: 3.9 MMOL/L (ref 3.5–5.1)
PROT SERPL-MCNC: 7.2 G/DL (ref 6–8.4)
SODIUM SERPL-SCNC: 141 MMOL/L (ref 136–145)
T4 FREE SERPL-MCNC: 0.85 NG/DL (ref 0.71–1.51)
TRIGL SERPL-MCNC: 156 MG/DL (ref 30–150)
TSH SERPL DL<=0.005 MIU/L-ACNC: 1.41 UIU/ML (ref 0.34–5.6)

## 2020-12-23 PROCEDURE — 84443 ASSAY THYROID STIM HORMONE: CPT

## 2020-12-23 PROCEDURE — 80053 COMPREHEN METABOLIC PANEL: CPT

## 2020-12-23 PROCEDURE — 36415 COLL VENOUS BLD VENIPUNCTURE: CPT

## 2020-12-23 PROCEDURE — 80061 LIPID PANEL: CPT

## 2020-12-23 PROCEDURE — 84439 ASSAY OF FREE THYROXINE: CPT

## 2021-03-04 ENCOUNTER — OFFICE VISIT (OUTPATIENT)
Dept: HEMATOLOGY/ONCOLOGY | Facility: CLINIC | Age: 56
End: 2021-03-04
Payer: COMMERCIAL

## 2021-03-04 VITALS
WEIGHT: 140.13 LBS | BODY MASS INDEX: 25.62 KG/M2 | SYSTOLIC BLOOD PRESSURE: 130 MMHG | RESPIRATION RATE: 18 BRPM | HEART RATE: 57 BPM | TEMPERATURE: 98 F | DIASTOLIC BLOOD PRESSURE: 71 MMHG

## 2021-03-04 DIAGNOSIS — Z17.1 MALIGNANT NEOPLASM OF UPPER-OUTER QUADRANT OF LEFT BREAST IN FEMALE, ESTROGEN RECEPTOR NEGATIVE: Chronic | ICD-10-CM

## 2021-03-04 DIAGNOSIS — C50.412 MALIGNANT NEOPLASM OF UPPER-OUTER QUADRANT OF LEFT BREAST IN FEMALE, ESTROGEN RECEPTOR NEGATIVE: Chronic | ICD-10-CM

## 2021-03-04 PROCEDURE — 1126F AMNT PAIN NOTED NONE PRSNT: CPT | Mod: S$GLB,,, | Performed by: INTERNAL MEDICINE

## 2021-03-04 PROCEDURE — 99213 OFFICE O/P EST LOW 20 MIN: CPT | Mod: S$GLB,,, | Performed by: INTERNAL MEDICINE

## 2021-03-04 PROCEDURE — 3008F PR BODY MASS INDEX (BMI) DOCUMENTED: ICD-10-PCS | Mod: S$GLB,,, | Performed by: INTERNAL MEDICINE

## 2021-03-04 PROCEDURE — 3008F BODY MASS INDEX DOCD: CPT | Mod: S$GLB,,, | Performed by: INTERNAL MEDICINE

## 2021-03-04 PROCEDURE — 1126F PR PAIN SEVERITY QUANTIFIED, NO PAIN PRESENT: ICD-10-PCS | Mod: S$GLB,,, | Performed by: INTERNAL MEDICINE

## 2021-03-04 PROCEDURE — 99213 PR OFFICE/OUTPT VISIT, EST, LEVL III, 20-29 MIN: ICD-10-PCS | Mod: S$GLB,,, | Performed by: INTERNAL MEDICINE

## 2021-10-11 ENCOUNTER — OFFICE VISIT (OUTPATIENT)
Dept: HEMATOLOGY/ONCOLOGY | Facility: CLINIC | Age: 56
End: 2021-10-11
Payer: COMMERCIAL

## 2021-10-11 VITALS
HEIGHT: 62 IN | DIASTOLIC BLOOD PRESSURE: 74 MMHG | WEIGHT: 141 LBS | SYSTOLIC BLOOD PRESSURE: 129 MMHG | HEART RATE: 65 BPM | RESPIRATION RATE: 18 BRPM | BODY MASS INDEX: 25.95 KG/M2

## 2021-10-11 DIAGNOSIS — C50.412 MALIGNANT NEOPLASM OF UPPER-OUTER QUADRANT OF LEFT BREAST IN FEMALE, ESTROGEN RECEPTOR NEGATIVE: Chronic | ICD-10-CM

## 2021-10-11 DIAGNOSIS — Z17.1 MALIGNANT NEOPLASM OF UPPER-OUTER QUADRANT OF LEFT BREAST IN FEMALE, ESTROGEN RECEPTOR NEGATIVE: Chronic | ICD-10-CM

## 2021-10-11 PROCEDURE — 1160F PR REVIEW ALL MEDS BY PRESCRIBER/CLIN PHARMACIST DOCUMENTED: ICD-10-PCS | Mod: S$GLB,,, | Performed by: INTERNAL MEDICINE

## 2021-10-11 PROCEDURE — 3008F PR BODY MASS INDEX (BMI) DOCUMENTED: ICD-10-PCS | Mod: S$GLB,,, | Performed by: INTERNAL MEDICINE

## 2021-10-11 PROCEDURE — 3008F BODY MASS INDEX DOCD: CPT | Mod: S$GLB,,, | Performed by: INTERNAL MEDICINE

## 2021-10-11 PROCEDURE — 99213 OFFICE O/P EST LOW 20 MIN: CPT | Mod: S$GLB,,, | Performed by: INTERNAL MEDICINE

## 2021-10-11 PROCEDURE — 3074F SYST BP LT 130 MM HG: CPT | Mod: S$GLB,,, | Performed by: INTERNAL MEDICINE

## 2021-10-11 PROCEDURE — 99213 PR OFFICE/OUTPT VISIT, EST, LEVL III, 20-29 MIN: ICD-10-PCS | Mod: S$GLB,,, | Performed by: INTERNAL MEDICINE

## 2021-10-11 PROCEDURE — 3078F DIAST BP <80 MM HG: CPT | Mod: S$GLB,,, | Performed by: INTERNAL MEDICINE

## 2021-10-11 PROCEDURE — 3078F PR MOST RECENT DIASTOLIC BLOOD PRESSURE < 80 MM HG: ICD-10-PCS | Mod: S$GLB,,, | Performed by: INTERNAL MEDICINE

## 2021-10-11 PROCEDURE — 1160F RVW MEDS BY RX/DR IN RCRD: CPT | Mod: S$GLB,,, | Performed by: INTERNAL MEDICINE

## 2021-10-11 PROCEDURE — 3074F PR MOST RECENT SYSTOLIC BLOOD PRESSURE < 130 MM HG: ICD-10-PCS | Mod: S$GLB,,, | Performed by: INTERNAL MEDICINE

## 2021-10-18 ENCOUNTER — LAB VISIT (OUTPATIENT)
Dept: LAB | Facility: HOSPITAL | Age: 56
End: 2021-10-18
Attending: INTERNAL MEDICINE
Payer: COMMERCIAL

## 2021-10-18 DIAGNOSIS — M10.062: Primary | ICD-10-CM

## 2021-10-18 DIAGNOSIS — D50.0 IRON DEFICIENCY ANEMIA SECONDARY TO BLOOD LOSS (CHRONIC): ICD-10-CM

## 2021-10-18 DIAGNOSIS — M06.062: ICD-10-CM

## 2021-10-18 LAB
BASOPHILS # BLD AUTO: 0.03 K/UL (ref 0–0.2)
BASOPHILS NFR BLD: 0.5 % (ref 0–1.9)
DIFFERENTIAL METHOD: NORMAL
EOSINOPHIL # BLD AUTO: 0.2 K/UL (ref 0–0.5)
EOSINOPHIL NFR BLD: 2.8 % (ref 0–8)
ERYTHROCYTE [DISTWIDTH] IN BLOOD BY AUTOMATED COUNT: 12.8 % (ref 11.5–14.5)
ERYTHROCYTE [SEDIMENTATION RATE] IN BLOOD BY WESTERGREN METHOD: 2 MM/HR (ref 0–20)
HCT VFR BLD AUTO: 40.5 % (ref 37–48.5)
HGB BLD-MCNC: 13.1 G/DL (ref 12–16)
IMM GRANULOCYTES # BLD AUTO: 0.01 K/UL (ref 0–0.04)
IMM GRANULOCYTES NFR BLD AUTO: 0.2 % (ref 0–0.5)
LYMPHOCYTES # BLD AUTO: 2.3 K/UL (ref 1–4.8)
LYMPHOCYTES NFR BLD: 39 % (ref 18–48)
MCH RBC QN AUTO: 31 PG (ref 27–31)
MCHC RBC AUTO-ENTMCNC: 32.3 G/DL (ref 32–36)
MCV RBC AUTO: 96 FL (ref 82–98)
MONOCYTES # BLD AUTO: 0.6 K/UL (ref 0.3–1)
MONOCYTES NFR BLD: 10 % (ref 4–15)
NEUTROPHILS # BLD AUTO: 2.8 K/UL (ref 1.8–7.7)
NEUTROPHILS NFR BLD: 47.5 % (ref 38–73)
NRBC BLD-RTO: 0 /100 WBC
PLATELET # BLD AUTO: 273 K/UL (ref 150–450)
PMV BLD AUTO: 9.7 FL (ref 9.2–12.9)
RBC # BLD AUTO: 4.22 M/UL (ref 4–5.4)
URATE SERPL-MCNC: 4.8 MG/DL (ref 2.4–5.7)
WBC # BLD AUTO: 5.79 K/UL (ref 3.9–12.7)

## 2021-10-18 PROCEDURE — 36415 COLL VENOUS BLD VENIPUNCTURE: CPT | Performed by: INTERNAL MEDICINE

## 2021-10-18 PROCEDURE — 85025 COMPLETE CBC W/AUTO DIFF WBC: CPT | Performed by: INTERNAL MEDICINE

## 2021-10-18 PROCEDURE — 84550 ASSAY OF BLOOD/URIC ACID: CPT | Performed by: INTERNAL MEDICINE

## 2021-10-18 PROCEDURE — 85651 RBC SED RATE NONAUTOMATED: CPT | Performed by: INTERNAL MEDICINE

## 2022-01-07 ENCOUNTER — LAB VISIT (OUTPATIENT)
Dept: LAB | Facility: HOSPITAL | Age: 57
End: 2022-01-07
Attending: INTERNAL MEDICINE
Payer: COMMERCIAL

## 2022-01-07 DIAGNOSIS — E03.4 IDIOPATHIC ATROPHIC HYPOTHYROIDISM: ICD-10-CM

## 2022-01-07 DIAGNOSIS — E78.2 MIXED HYPERLIPIDEMIA: ICD-10-CM

## 2022-01-07 DIAGNOSIS — D50.0 IRON DEFICIENCY ANEMIA SECONDARY TO BLOOD LOSS (CHRONIC): ICD-10-CM

## 2022-01-07 DIAGNOSIS — R10.11 ABDOMINAL PAIN, RIGHT UPPER QUADRANT: Primary | ICD-10-CM

## 2022-01-07 LAB
ALBUMIN SERPL BCP-MCNC: 4.2 G/DL (ref 3.5–5.2)
ALP SERPL-CCNC: 73 U/L (ref 55–135)
ALT SERPL W/O P-5'-P-CCNC: 21 U/L (ref 10–44)
ANION GAP SERPL CALC-SCNC: 8 MMOL/L (ref 8–16)
AST SERPL-CCNC: 20 U/L (ref 10–40)
BASOPHILS # BLD AUTO: 0.04 K/UL (ref 0–0.2)
BASOPHILS NFR BLD: 0.6 % (ref 0–1.9)
BILIRUB SERPL-MCNC: 1 MG/DL (ref 0.1–1)
BUN SERPL-MCNC: 14 MG/DL (ref 6–20)
CALCIUM SERPL-MCNC: 9.4 MG/DL (ref 8.7–10.5)
CHLORIDE SERPL-SCNC: 104 MMOL/L (ref 95–110)
CHOLEST SERPL-MCNC: 205 MG/DL (ref 120–199)
CHOLEST/HDLC SERPL: 4.5 {RATIO} (ref 2–5)
CO2 SERPL-SCNC: 28 MMOL/L (ref 23–29)
CREAT SERPL-MCNC: 0.7 MG/DL (ref 0.5–1.4)
DIFFERENTIAL METHOD: NORMAL
EOSINOPHIL # BLD AUTO: 0.3 K/UL (ref 0–0.5)
EOSINOPHIL NFR BLD: 4.2 % (ref 0–8)
ERYTHROCYTE [DISTWIDTH] IN BLOOD BY AUTOMATED COUNT: 13.2 % (ref 11.5–14.5)
EST. GFR  (AFRICAN AMERICAN): >60 ML/MIN/1.73 M^2
EST. GFR  (NON AFRICAN AMERICAN): >60 ML/MIN/1.73 M^2
GLUCOSE SERPL-MCNC: 92 MG/DL (ref 70–110)
HCT VFR BLD AUTO: 39.7 % (ref 37–48.5)
HDLC SERPL-MCNC: 46 MG/DL (ref 40–75)
HDLC SERPL: 22.4 % (ref 20–50)
HGB BLD-MCNC: 13 G/DL (ref 12–16)
IMM GRANULOCYTES # BLD AUTO: 0.01 K/UL (ref 0–0.04)
IMM GRANULOCYTES NFR BLD AUTO: 0.2 % (ref 0–0.5)
LDLC SERPL CALC-MCNC: 132.8 MG/DL (ref 63–159)
LYMPHOCYTES # BLD AUTO: 2.6 K/UL (ref 1–4.8)
LYMPHOCYTES NFR BLD: 41.2 % (ref 18–48)
MCH RBC QN AUTO: 30.6 PG (ref 27–31)
MCHC RBC AUTO-ENTMCNC: 32.7 G/DL (ref 32–36)
MCV RBC AUTO: 93 FL (ref 82–98)
MONOCYTES # BLD AUTO: 0.5 K/UL (ref 0.3–1)
MONOCYTES NFR BLD: 8.1 % (ref 4–15)
NEUTROPHILS # BLD AUTO: 2.8 K/UL (ref 1.8–7.7)
NEUTROPHILS NFR BLD: 45.7 % (ref 38–73)
NONHDLC SERPL-MCNC: 159 MG/DL
NRBC BLD-RTO: 0 /100 WBC
PLATELET # BLD AUTO: 277 K/UL (ref 150–450)
PMV BLD AUTO: 9.3 FL (ref 9.2–12.9)
POTASSIUM SERPL-SCNC: 4.3 MMOL/L (ref 3.5–5.1)
PROT SERPL-MCNC: 7.1 G/DL (ref 6–8.4)
RBC # BLD AUTO: 4.25 M/UL (ref 4–5.4)
SODIUM SERPL-SCNC: 140 MMOL/L (ref 136–145)
T4 FREE SERPL-MCNC: 0.97 NG/DL (ref 0.71–1.51)
TRIGL SERPL-MCNC: 131 MG/DL (ref 30–150)
TSH SERPL DL<=0.005 MIU/L-ACNC: 2.6 UIU/ML (ref 0.34–5.6)
WBC # BLD AUTO: 6.21 K/UL (ref 3.9–12.7)

## 2022-01-07 PROCEDURE — 85025 COMPLETE CBC W/AUTO DIFF WBC: CPT | Performed by: INTERNAL MEDICINE

## 2022-01-07 PROCEDURE — 36415 COLL VENOUS BLD VENIPUNCTURE: CPT | Performed by: INTERNAL MEDICINE

## 2022-01-07 PROCEDURE — 84439 ASSAY OF FREE THYROXINE: CPT | Performed by: INTERNAL MEDICINE

## 2022-01-07 PROCEDURE — 80053 COMPREHEN METABOLIC PANEL: CPT | Performed by: INTERNAL MEDICINE

## 2022-01-07 PROCEDURE — 80061 LIPID PANEL: CPT | Performed by: INTERNAL MEDICINE

## 2022-01-07 PROCEDURE — 84443 ASSAY THYROID STIM HORMONE: CPT | Performed by: INTERNAL MEDICINE

## 2022-04-14 ENCOUNTER — OFFICE VISIT (OUTPATIENT)
Dept: HEMATOLOGY/ONCOLOGY | Facility: CLINIC | Age: 57
End: 2022-04-14
Payer: COMMERCIAL

## 2022-04-14 VITALS
RESPIRATION RATE: 18 BRPM | HEART RATE: 55 BPM | DIASTOLIC BLOOD PRESSURE: 70 MMHG | SYSTOLIC BLOOD PRESSURE: 120 MMHG | TEMPERATURE: 99 F

## 2022-04-14 DIAGNOSIS — Z17.1 MALIGNANT NEOPLASM OF UPPER-OUTER QUADRANT OF LEFT BREAST IN FEMALE, ESTROGEN RECEPTOR NEGATIVE: Chronic | ICD-10-CM

## 2022-04-14 DIAGNOSIS — C50.412 MALIGNANT NEOPLASM OF UPPER-OUTER QUADRANT OF LEFT BREAST IN FEMALE, ESTROGEN RECEPTOR NEGATIVE: Chronic | ICD-10-CM

## 2022-04-14 PROCEDURE — 3078F PR MOST RECENT DIASTOLIC BLOOD PRESSURE < 80 MM HG: ICD-10-PCS | Mod: S$GLB,,, | Performed by: INTERNAL MEDICINE

## 2022-04-14 PROCEDURE — 3074F PR MOST RECENT SYSTOLIC BLOOD PRESSURE < 130 MM HG: ICD-10-PCS | Mod: S$GLB,,, | Performed by: INTERNAL MEDICINE

## 2022-04-14 PROCEDURE — 99213 OFFICE O/P EST LOW 20 MIN: CPT | Mod: S$GLB,,, | Performed by: INTERNAL MEDICINE

## 2022-04-14 PROCEDURE — 99213 PR OFFICE/OUTPT VISIT, EST, LEVL III, 20-29 MIN: ICD-10-PCS | Mod: S$GLB,,, | Performed by: INTERNAL MEDICINE

## 2022-04-14 PROCEDURE — 3074F SYST BP LT 130 MM HG: CPT | Mod: S$GLB,,, | Performed by: INTERNAL MEDICINE

## 2022-04-14 PROCEDURE — 3078F DIAST BP <80 MM HG: CPT | Mod: S$GLB,,, | Performed by: INTERNAL MEDICINE

## 2022-04-14 NOTE — ASSESSMENT & PLAN NOTE
Ms. Shankar is doing well today and appears INDIO.  Exam is negative and she overall looks good.  Will continue to see her every six months and discussed this today.

## 2022-04-14 NOTE — PROGRESS NOTES
PROGRESS NOTE    Subjective:       Patient ID: Jordan Lion is a 56 y.o. female.    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  f/u breast cancer.     History of Present Illness:   Jordan Lion is a 56 y.o. female who presents for routine follow up of breast cancer.      Ms. Lion is doing ok at this time with no new complaints.     Family and Social history reviewed and is unchanged from 3/30/2015.       ROS:  Review of Systems   Constitutional: Negative for fever.   Respiratory: Negative for shortness of breath.    Cardiovascular: Negative for chest pain and leg swelling.   Gastrointestinal: Negative for abdominal pain and blood in stool.   Genitourinary: Negative for hematuria.   Skin: Negative for rash.          Current Outpatient Medications:     b complex vitamins tablet, Take 1 tablet by mouth once daily., Disp: , Rfl:     gluc carvalho/chondro carvalho A/vit C/Mn (GLUCOSAMINE 1500 COMPLEX ORAL), Take by mouth., Disp: , Rfl:     vitamin D (VITAMIN D3) 1000 units Tab, Take 1,000 Units by mouth once daily., Disp: , Rfl:         Objective:       Physical Examination:     There were no vitals taken for this visit.    Physical Exam  Constitutional:       Appearance: She is well-developed.   HENT:      Head: Normocephalic and atraumatic.      Right Ear: External ear normal.      Left Ear: External ear normal.   Eyes:      Conjunctiva/sclera: Conjunctivae normal.      Pupils: Pupils are equal, round, and reactive to light.   Neck:      Thyroid: No thyromegaly.      Trachea: No tracheal deviation.   Cardiovascular:      Rate and Rhythm: Normal rate and regular rhythm.      Heart sounds: Normal heart sounds.   Pulmonary:      Effort: Pulmonary effort is normal.      Breath sounds: Normal breath sounds.   Chest:       Abdominal:       General: Bowel sounds are normal. There is no distension.      Palpations: Abdomen is soft. There is no mass.      Tenderness: There is no abdominal tenderness.   Skin:     Findings: No rash.   Neurological:      Comments: Neuro intact througout   Psychiatric:         Behavior: Behavior normal.         Thought Content: Thought content normal.         Judgment: Judgment normal.         Labs:   No results found for this or any previous visit (from the past 336 hour(s)).  CMP  Sodium   Date Value Ref Range Status   01/07/2022 140 136 - 145 mmol/L Final     Potassium   Date Value Ref Range Status   01/07/2022 4.3 3.5 - 5.1 mmol/L Final     Chloride   Date Value Ref Range Status   01/07/2022 104 95 - 110 mmol/L Final     CO2   Date Value Ref Range Status   01/07/2022 28 23 - 29 mmol/L Final     Glucose   Date Value Ref Range Status   01/07/2022 92 70 - 110 mg/dL Final     BUN   Date Value Ref Range Status   01/07/2022 14 6 - 20 mg/dL Final     Creatinine   Date Value Ref Range Status   01/07/2022 0.7 0.5 - 1.4 mg/dL Final     Calcium   Date Value Ref Range Status   01/07/2022 9.4 8.7 - 10.5 mg/dL Final     Total Protein   Date Value Ref Range Status   01/07/2022 7.1 6.0 - 8.4 g/dL Final     Albumin   Date Value Ref Range Status   01/07/2022 4.2 3.5 - 5.2 g/dL Final     Total Bilirubin   Date Value Ref Range Status   01/07/2022 1.0 0.1 - 1.0 mg/dL Final     Comment:     For infants and newborns, interpretation of results should be based  on gestational age, weight and in agreement with clinical  observations.    Premature Infant recommended reference ranges:  Up to 24 hours.............<8.0 mg/dL  Up to 48 hours............<12.0 mg/dL  3-5 days..................<15.0 mg/dL  6-29 days.................<15.0 mg/dL       Alkaline Phosphatase   Date Value Ref Range Status   01/07/2022 73 55 - 135 U/L Final     AST   Date Value Ref Range Status   01/07/2022 20 10 - 40 U/L Final     ALT   Date Value Ref Range Status   01/07/2022  21 10 - 44 U/L Final     Anion Gap   Date Value Ref Range Status   01/07/2022 8 8 - 16 mmol/L Final     eGFR if    Date Value Ref Range Status   01/07/2022 >60.0 >60 mL/min/1.73 m^2 Final     eGFR if non    Date Value Ref Range Status   01/07/2022 >60.0 >60 mL/min/1.73 m^2 Final     Comment:     Calculation used to obtain the estimated glomerular filtration  rate (eGFR) is the CKD-EPI equation.        No results found for: CEA  No results found for: PSA        Assessment/Plan:     Problem List Items Addressed This Visit     Malignant neoplasm of upper-outer quadrant of left breast in female, estrogen receptor negative (Chronic)     Ms. Shankar is doing well today and appears INDIO.  Exam is negative and she overall looks good.  Will continue to see her every six months and discussed this today.                   Discussion:     Follow up in about 6 months (around 10/14/2022).      Electronically signed by Chase Colon

## 2022-09-07 ENCOUNTER — LAB VISIT (OUTPATIENT)
Dept: LAB | Facility: HOSPITAL | Age: 57
End: 2022-09-07
Attending: INTERNAL MEDICINE
Payer: COMMERCIAL

## 2022-09-07 DIAGNOSIS — R10.11 ABDOMINAL PAIN, RIGHT UPPER QUADRANT: Primary | ICD-10-CM

## 2022-09-07 DIAGNOSIS — E78.2 MIXED HYPERLIPIDEMIA: ICD-10-CM

## 2022-09-07 LAB
ALBUMIN SERPL BCP-MCNC: 4.2 G/DL (ref 3.5–5.2)
ALP SERPL-CCNC: 75 U/L (ref 55–135)
ALT SERPL W/O P-5'-P-CCNC: 20 U/L (ref 10–44)
ANION GAP SERPL CALC-SCNC: 6 MMOL/L (ref 8–16)
AST SERPL-CCNC: 20 U/L (ref 10–40)
BILIRUB SERPL-MCNC: 0.9 MG/DL (ref 0.1–1)
BUN SERPL-MCNC: 14 MG/DL (ref 6–20)
CALCIUM SERPL-MCNC: 9.2 MG/DL (ref 8.7–10.5)
CHLORIDE SERPL-SCNC: 108 MMOL/L (ref 95–110)
CHOLEST SERPL-MCNC: 200 MG/DL (ref 120–199)
CHOLEST/HDLC SERPL: 4.3 {RATIO} (ref 2–5)
CO2 SERPL-SCNC: 27 MMOL/L (ref 23–29)
CREAT SERPL-MCNC: 0.6 MG/DL (ref 0.5–1.4)
EST. GFR  (NO RACE VARIABLE): >60 ML/MIN/1.73 M^2
GLUCOSE SERPL-MCNC: 89 MG/DL (ref 70–110)
HDLC SERPL-MCNC: 46 MG/DL (ref 40–75)
HDLC SERPL: 23 % (ref 20–50)
LDLC SERPL CALC-MCNC: 131.2 MG/DL (ref 63–159)
NONHDLC SERPL-MCNC: 154 MG/DL
POTASSIUM SERPL-SCNC: 4 MMOL/L (ref 3.5–5.1)
PROT SERPL-MCNC: 7.1 G/DL (ref 6–8.4)
SODIUM SERPL-SCNC: 141 MMOL/L (ref 136–145)
TRIGL SERPL-MCNC: 114 MG/DL (ref 30–150)

## 2022-09-07 PROCEDURE — 36415 COLL VENOUS BLD VENIPUNCTURE: CPT | Performed by: INTERNAL MEDICINE

## 2022-09-07 PROCEDURE — 80061 LIPID PANEL: CPT | Performed by: INTERNAL MEDICINE

## 2022-09-07 PROCEDURE — 80053 COMPREHEN METABOLIC PANEL: CPT | Performed by: INTERNAL MEDICINE

## 2022-10-17 NOTE — PROGRESS NOTES
PROGRESS NOTE    Subjective:       Patient ID: Jordan Lion is a 57 y.o. female.    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  f/u breast cancer.     History of Present Illness:   Jordan Lion is a 57 y.o. female who presents for routine follow up of breast cancer.      Patient is doing well with no new complaints at this time.      Family and Social history reviewed and is unchanged from 3/30/2015.       ROS:  Review of Systems   Constitutional:  Negative for fever.   Respiratory:  Negative for shortness of breath.    Cardiovascular:  Negative for chest pain and leg swelling.   Gastrointestinal:  Negative for abdominal pain and blood in stool.   Genitourinary:  Negative for hematuria.   Skin:  Negative for rash.        Current Outpatient Medications:     b complex vitamins tablet, Take 1 tablet by mouth once daily., Disp: , Rfl:     gluc carvalho/chondro carvalho A/vit C/Mn (GLUCOSAMINE 1500 COMPLEX ORAL), Take by mouth., Disp: , Rfl:     vitamin D (VITAMIN D3) 1000 units Tab, Take 1,000 Units by mouth once daily., Disp: , Rfl:         Objective:       Physical Examination:     There were no vitals taken for this visit.    Physical Exam  Constitutional:       Appearance: She is well-developed.   HENT:      Head: Normocephalic and atraumatic.      Right Ear: External ear normal.      Left Ear: External ear normal.   Eyes:      Conjunctiva/sclera: Conjunctivae normal.      Pupils: Pupils are equal, round, and reactive to light.   Neck:      Thyroid: No thyromegaly.      Trachea: No tracheal deviation.   Cardiovascular:      Rate and Rhythm: Normal rate and regular rhythm.      Heart sounds: Normal heart sounds.   Pulmonary:      Effort: Pulmonary effort is normal.      Breath sounds: Normal breath sounds.   Chest:       Abdominal:       General: Bowel sounds are normal. There is no distension.      Palpations: Abdomen is soft. There is no mass.      Tenderness: There is no abdominal tenderness.   Skin:     Findings: No rash.   Neurological:      Comments: Neuro intact througout   Psychiatric:         Behavior: Behavior normal.         Thought Content: Thought content normal.         Judgment: Judgment normal.       Labs:   No results found for this or any previous visit (from the past 336 hour(s)).  CMP  Sodium   Date Value Ref Range Status   09/07/2022 141 136 - 145 mmol/L Final     Potassium   Date Value Ref Range Status   09/07/2022 4.0 3.5 - 5.1 mmol/L Final     Chloride   Date Value Ref Range Status   09/07/2022 108 95 - 110 mmol/L Final     CO2   Date Value Ref Range Status   09/07/2022 27 23 - 29 mmol/L Final     Glucose   Date Value Ref Range Status   09/07/2022 89 70 - 110 mg/dL Final     BUN   Date Value Ref Range Status   09/07/2022 14 6 - 20 mg/dL Final     Creatinine   Date Value Ref Range Status   09/07/2022 0.6 0.5 - 1.4 mg/dL Final     Calcium   Date Value Ref Range Status   09/07/2022 9.2 8.7 - 10.5 mg/dL Final     Total Protein   Date Value Ref Range Status   09/07/2022 7.1 6.0 - 8.4 g/dL Final     Albumin   Date Value Ref Range Status   09/07/2022 4.2 3.5 - 5.2 g/dL Final     Total Bilirubin   Date Value Ref Range Status   09/07/2022 0.9 0.1 - 1.0 mg/dL Final     Comment:     For infants and newborns, interpretation of results should be based  on gestational age, weight and in agreement with clinical  observations.    Premature Infant recommended reference ranges:  Up to 24 hours.............<8.0 mg/dL  Up to 48 hours............<12.0 mg/dL  3-5 days..................<15.0 mg/dL  6-29 days.................<15.0 mg/dL       Alkaline Phosphatase   Date Value Ref Range Status   09/07/2022 75 55 - 135 U/L Final     AST   Date Value Ref Range Status   09/07/2022 20 10 - 40 U/L Final     ALT   Date Value Ref Range Status   09/07/2022  20 10 - 44 U/L Final     Anion Gap   Date Value Ref Range Status   09/07/2022 6 (L) 8 - 16 mmol/L Final     eGFR if    Date Value Ref Range Status   01/07/2022 >60.0 >60 mL/min/1.73 m^2 Final     eGFR if non    Date Value Ref Range Status   01/07/2022 >60.0 >60 mL/min/1.73 m^2 Final     Comment:     Calculation used to obtain the estimated glomerular filtration  rate (eGFR) is the CKD-EPI equation.        No results found for: CEA  No results found for: PSA        Assessment/Plan:     Problem List Items Addressed This Visit       Malignant neoplasm of upper-outer quadrant of left breast in female, estrogen receptor negative (Chronic)     Patient is doing well and appears INDIO at this time.  No new complaints or issues.  Will continue to see her on a six month basis and discussed this today.              Discussion:     Follow up in about 6 months (around 4/18/2023).      Electronically signed by Chase Colon

## 2022-10-18 ENCOUNTER — OFFICE VISIT (OUTPATIENT)
Dept: HEMATOLOGY/ONCOLOGY | Facility: CLINIC | Age: 57
End: 2022-10-18
Payer: COMMERCIAL

## 2022-10-18 VITALS — SYSTOLIC BLOOD PRESSURE: 122 MMHG | DIASTOLIC BLOOD PRESSURE: 58 MMHG | HEART RATE: 63 BPM | TEMPERATURE: 98 F

## 2022-10-18 DIAGNOSIS — C50.412 MALIGNANT NEOPLASM OF UPPER-OUTER QUADRANT OF LEFT BREAST IN FEMALE, ESTROGEN RECEPTOR NEGATIVE: Chronic | ICD-10-CM

## 2022-10-18 DIAGNOSIS — Z17.1 MALIGNANT NEOPLASM OF UPPER-OUTER QUADRANT OF LEFT BREAST IN FEMALE, ESTROGEN RECEPTOR NEGATIVE: Chronic | ICD-10-CM

## 2022-10-18 PROCEDURE — 3074F SYST BP LT 130 MM HG: CPT | Mod: CPTII,S$GLB,, | Performed by: INTERNAL MEDICINE

## 2022-10-18 PROCEDURE — 1160F PR REVIEW ALL MEDS BY PRESCRIBER/CLIN PHARMACIST DOCUMENTED: ICD-10-PCS | Mod: CPTII,S$GLB,, | Performed by: INTERNAL MEDICINE

## 2022-10-18 PROCEDURE — 1160F RVW MEDS BY RX/DR IN RCRD: CPT | Mod: CPTII,S$GLB,, | Performed by: INTERNAL MEDICINE

## 2022-10-18 PROCEDURE — 1159F PR MEDICATION LIST DOCUMENTED IN MEDICAL RECORD: ICD-10-PCS | Mod: CPTII,S$GLB,, | Performed by: INTERNAL MEDICINE

## 2022-10-18 PROCEDURE — 3078F PR MOST RECENT DIASTOLIC BLOOD PRESSURE < 80 MM HG: ICD-10-PCS | Mod: CPTII,S$GLB,, | Performed by: INTERNAL MEDICINE

## 2022-10-18 PROCEDURE — 1159F MED LIST DOCD IN RCRD: CPT | Mod: CPTII,S$GLB,, | Performed by: INTERNAL MEDICINE

## 2022-10-18 PROCEDURE — 3074F PR MOST RECENT SYSTOLIC BLOOD PRESSURE < 130 MM HG: ICD-10-PCS | Mod: CPTII,S$GLB,, | Performed by: INTERNAL MEDICINE

## 2022-10-18 PROCEDURE — 99213 PR OFFICE/OUTPT VISIT, EST, LEVL III, 20-29 MIN: ICD-10-PCS | Mod: S$GLB,,, | Performed by: INTERNAL MEDICINE

## 2022-10-18 PROCEDURE — 99213 OFFICE O/P EST LOW 20 MIN: CPT | Mod: S$GLB,,, | Performed by: INTERNAL MEDICINE

## 2022-10-18 PROCEDURE — 3078F DIAST BP <80 MM HG: CPT | Mod: CPTII,S$GLB,, | Performed by: INTERNAL MEDICINE

## 2022-10-18 NOTE — ASSESSMENT & PLAN NOTE
Patient is doing well and appears INDIO at this time.  No new complaints or issues.  Will continue to see her on a six month basis and discussed this today.

## 2023-04-10 ENCOUNTER — LAB VISIT (OUTPATIENT)
Dept: LAB | Facility: HOSPITAL | Age: 58
End: 2023-04-10
Attending: INTERNAL MEDICINE
Payer: COMMERCIAL

## 2023-04-10 DIAGNOSIS — R10.11 ABDOMINAL PAIN, RIGHT UPPER QUADRANT: Primary | ICD-10-CM

## 2023-04-10 DIAGNOSIS — E78.2 MIXED HYPERLIPIDEMIA: ICD-10-CM

## 2023-04-10 LAB
ALBUMIN SERPL BCP-MCNC: 4.3 G/DL (ref 3.5–5.2)
ALP SERPL-CCNC: 82 U/L (ref 55–135)
ALT SERPL W/O P-5'-P-CCNC: 20 U/L (ref 10–44)
ANION GAP SERPL CALC-SCNC: 9 MMOL/L (ref 8–16)
AST SERPL-CCNC: 22 U/L (ref 10–40)
BILIRUB SERPL-MCNC: 0.8 MG/DL (ref 0.1–1)
BUN SERPL-MCNC: 13 MG/DL (ref 6–20)
CALCIUM SERPL-MCNC: 9.3 MG/DL (ref 8.7–10.5)
CHLORIDE SERPL-SCNC: 106 MMOL/L (ref 95–110)
CHOLEST SERPL-MCNC: 185 MG/DL (ref 120–199)
CHOLEST/HDLC SERPL: 3.9 {RATIO} (ref 2–5)
CO2 SERPL-SCNC: 26 MMOL/L (ref 23–29)
CREAT SERPL-MCNC: 0.7 MG/DL (ref 0.5–1.4)
EST. GFR  (NO RACE VARIABLE): >60 ML/MIN/1.73 M^2
GLUCOSE SERPL-MCNC: 88 MG/DL (ref 70–110)
HDLC SERPL-MCNC: 48 MG/DL (ref 40–75)
HDLC SERPL: 25.9 % (ref 20–50)
LDLC SERPL CALC-MCNC: 94.4 MG/DL (ref 63–159)
NONHDLC SERPL-MCNC: 137 MG/DL
POTASSIUM SERPL-SCNC: 4.1 MMOL/L (ref 3.5–5.1)
PROT SERPL-MCNC: 7.3 G/DL (ref 6–8.4)
SODIUM SERPL-SCNC: 141 MMOL/L (ref 136–145)
TRIGL SERPL-MCNC: 213 MG/DL (ref 30–150)

## 2023-04-10 PROCEDURE — 36415 COLL VENOUS BLD VENIPUNCTURE: CPT | Performed by: INTERNAL MEDICINE

## 2023-04-10 PROCEDURE — 80061 LIPID PANEL: CPT | Performed by: INTERNAL MEDICINE

## 2023-04-10 PROCEDURE — 80053 COMPREHEN METABOLIC PANEL: CPT | Performed by: INTERNAL MEDICINE

## 2023-04-18 ENCOUNTER — OFFICE VISIT (OUTPATIENT)
Dept: HEMATOLOGY/ONCOLOGY | Facility: CLINIC | Age: 58
End: 2023-04-18
Payer: COMMERCIAL

## 2023-04-18 VITALS
SYSTOLIC BLOOD PRESSURE: 142 MMHG | WEIGHT: 142 LBS | RESPIRATION RATE: 18 BRPM | HEART RATE: 52 BPM | TEMPERATURE: 98 F | DIASTOLIC BLOOD PRESSURE: 65 MMHG | HEIGHT: 62 IN | BODY MASS INDEX: 26.13 KG/M2 | OXYGEN SATURATION: 98 %

## 2023-04-18 DIAGNOSIS — C50.412 MALIGNANT NEOPLASM OF UPPER-OUTER QUADRANT OF LEFT BREAST IN FEMALE, ESTROGEN RECEPTOR NEGATIVE: Chronic | ICD-10-CM

## 2023-04-18 DIAGNOSIS — Z17.1 MALIGNANT NEOPLASM OF UPPER-OUTER QUADRANT OF LEFT BREAST IN FEMALE, ESTROGEN RECEPTOR NEGATIVE: Chronic | ICD-10-CM

## 2023-04-18 PROCEDURE — 3077F PR MOST RECENT SYSTOLIC BLOOD PRESSURE >= 140 MM HG: ICD-10-PCS | Mod: CPTII,S$GLB,, | Performed by: INTERNAL MEDICINE

## 2023-04-18 PROCEDURE — 99213 OFFICE O/P EST LOW 20 MIN: CPT | Mod: S$GLB,,, | Performed by: INTERNAL MEDICINE

## 2023-04-18 PROCEDURE — 99213 PR OFFICE/OUTPT VISIT, EST, LEVL III, 20-29 MIN: ICD-10-PCS | Mod: S$GLB,,, | Performed by: INTERNAL MEDICINE

## 2023-04-18 PROCEDURE — 1160F PR REVIEW ALL MEDS BY PRESCRIBER/CLIN PHARMACIST DOCUMENTED: ICD-10-PCS | Mod: CPTII,S$GLB,, | Performed by: INTERNAL MEDICINE

## 2023-04-18 PROCEDURE — 3008F PR BODY MASS INDEX (BMI) DOCUMENTED: ICD-10-PCS | Mod: CPTII,S$GLB,, | Performed by: INTERNAL MEDICINE

## 2023-04-18 PROCEDURE — 3077F SYST BP >= 140 MM HG: CPT | Mod: CPTII,S$GLB,, | Performed by: INTERNAL MEDICINE

## 2023-04-18 PROCEDURE — 3008F BODY MASS INDEX DOCD: CPT | Mod: CPTII,S$GLB,, | Performed by: INTERNAL MEDICINE

## 2023-04-18 PROCEDURE — 1160F RVW MEDS BY RX/DR IN RCRD: CPT | Mod: CPTII,S$GLB,, | Performed by: INTERNAL MEDICINE

## 2023-04-18 PROCEDURE — 1159F PR MEDICATION LIST DOCUMENTED IN MEDICAL RECORD: ICD-10-PCS | Mod: CPTII,S$GLB,, | Performed by: INTERNAL MEDICINE

## 2023-04-18 PROCEDURE — 1159F MED LIST DOCD IN RCRD: CPT | Mod: CPTII,S$GLB,, | Performed by: INTERNAL MEDICINE

## 2023-04-18 PROCEDURE — 3078F DIAST BP <80 MM HG: CPT | Mod: CPTII,S$GLB,, | Performed by: INTERNAL MEDICINE

## 2023-04-18 PROCEDURE — 3078F PR MOST RECENT DIASTOLIC BLOOD PRESSURE < 80 MM HG: ICD-10-PCS | Mod: CPTII,S$GLB,, | Performed by: INTERNAL MEDICINE

## 2023-04-18 RX ORDER — PREDNISONE 5 MG/1
5 TABLET ORAL 2 TIMES DAILY
COMMUNITY
Start: 2023-04-12

## 2023-04-18 NOTE — ASSESSMENT & PLAN NOTE
Patient is doing well and appears INDIO.  Exam is negative and will continue to see her on a six month basis.  Discussed this today.

## 2023-04-18 NOTE — PROGRESS NOTES
"                                                         PROGRESS NOTE    Subjective:       Patient ID: Jordan Lion is a 57 y.o. female.    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  f/u breast cancer.     History of Present Illness:   Jordan Lion is a 57 y.o. female who presents for routine follow up of breast cancer.      Patient is doing well with no new complaints at this time.      Family and Social history reviewed and is unchanged from 3/30/2015.       ROS:  Review of Systems   Constitutional:  Negative for fever.   Respiratory:  Negative for shortness of breath.    Cardiovascular:  Negative for chest pain and leg swelling.   Gastrointestinal:  Negative for abdominal pain and blood in stool.   Genitourinary:  Negative for hematuria.   Skin:  Negative for rash.        Current Outpatient Medications:     b complex vitamins tablet, Take 1 tablet by mouth once daily., Disp: , Rfl:     gluc carvalho/chondro carvalho A/vit C/Mn (GLUCOSAMINE 1500 COMPLEX ORAL), Take by mouth., Disp: , Rfl:     predniSONE (DELTASONE) 5 MG tablet, Take 5 mg by mouth 2 (two) times daily., Disp: , Rfl:     vitamin D (VITAMIN D3) 1000 units Tab, Take 1,000 Units by mouth once daily., Disp: , Rfl:         Objective:       Physical Examination:     BP (!) 142/65   Pulse (!) 52   Temp 97.9 °F (36.6 °C)   Resp 18   Ht 5' 2" (1.575 m)   Wt 64.4 kg (142 lb)   SpO2 98%   BMI 25.97 kg/m²     Physical Exam  Constitutional:       Appearance: She is well-developed.   HENT:      Head: Normocephalic and atraumatic.      Right Ear: External ear normal.      Left Ear: External ear normal.   Eyes:      Conjunctiva/sclera: Conjunctivae normal.      Pupils: Pupils are equal, round, and reactive to light.   Neck:      Thyroid: No thyromegaly.      Trachea: No tracheal deviation.   Cardiovascular:      Rate and Rhythm: Normal " rate and regular rhythm.      Heart sounds: Normal heart sounds.   Pulmonary:      Effort: Pulmonary effort is normal.      Breath sounds: Normal breath sounds.   Chest:       Abdominal:      General: Bowel sounds are normal. There is no distension.      Palpations: Abdomen is soft. There is no mass.      Tenderness: There is no abdominal tenderness.   Skin:     Findings: No rash.   Neurological:      Comments: Neuro intact througout   Psychiatric:         Behavior: Behavior normal.         Thought Content: Thought content normal.         Judgment: Judgment normal.       Labs:   No results found for this or any previous visit (from the past 336 hour(s)).  CMP  Sodium   Date Value Ref Range Status   04/10/2023 141 136 - 145 mmol/L Final     Potassium   Date Value Ref Range Status   04/10/2023 4.1 3.5 - 5.1 mmol/L Final     Chloride   Date Value Ref Range Status   04/10/2023 106 95 - 110 mmol/L Final     CO2   Date Value Ref Range Status   04/10/2023 26 23 - 29 mmol/L Final     Glucose   Date Value Ref Range Status   04/10/2023 88 70 - 110 mg/dL Final     BUN   Date Value Ref Range Status   04/10/2023 13 6 - 20 mg/dL Final     Creatinine   Date Value Ref Range Status   04/10/2023 0.7 0.5 - 1.4 mg/dL Final     Calcium   Date Value Ref Range Status   04/10/2023 9.3 8.7 - 10.5 mg/dL Final     Total Protein   Date Value Ref Range Status   04/10/2023 7.3 6.0 - 8.4 g/dL Final     Albumin   Date Value Ref Range Status   04/10/2023 4.3 3.5 - 5.2 g/dL Final     Total Bilirubin   Date Value Ref Range Status   04/10/2023 0.8 0.1 - 1.0 mg/dL Final     Comment:     For infants and newborns, interpretation of results should be based  on gestational age, weight and in agreement with clinical  observations.    Premature Infant recommended reference ranges:  Up to 24 hours.............<8.0 mg/dL  Up to 48 hours............<12.0 mg/dL  3-5 days..................<15.0 mg/dL  6-29 days.................<15.0 mg/dL       Alkaline  Phosphatase   Date Value Ref Range Status   04/10/2023 82 55 - 135 U/L Final     AST   Date Value Ref Range Status   04/10/2023 22 10 - 40 U/L Final     ALT   Date Value Ref Range Status   04/10/2023 20 10 - 44 U/L Final     Anion Gap   Date Value Ref Range Status   04/10/2023 9 8 - 16 mmol/L Final     eGFR if    Date Value Ref Range Status   01/07/2022 >60.0 >60 mL/min/1.73 m^2 Final     eGFR if non    Date Value Ref Range Status   01/07/2022 >60.0 >60 mL/min/1.73 m^2 Final     Comment:     Calculation used to obtain the estimated glomerular filtration  rate (eGFR) is the CKD-EPI equation.        No results found for: CEA  No results found for: PSA        Assessment/Plan:     Problem List Items Addressed This Visit       Malignant neoplasm of upper-outer quadrant of left breast in female, estrogen receptor negative (Chronic)     Patient is doing well and appears INDIO.  Exam is negative and will continue to see her on a six month basis.  Discussed this today.             Discussion:     Follow up in about 6 months (around 10/18/2023).      Electronically signed by Chase Colon

## 2023-10-23 ENCOUNTER — OFFICE VISIT (OUTPATIENT)
Dept: HEMATOLOGY/ONCOLOGY | Facility: CLINIC | Age: 58
End: 2023-10-23
Payer: COMMERCIAL

## 2023-10-23 VITALS
OXYGEN SATURATION: 99 % | WEIGHT: 141.19 LBS | HEART RATE: 53 BPM | SYSTOLIC BLOOD PRESSURE: 122 MMHG | TEMPERATURE: 98 F | DIASTOLIC BLOOD PRESSURE: 58 MMHG | BODY MASS INDEX: 25.83 KG/M2

## 2023-10-23 DIAGNOSIS — C50.412 MALIGNANT NEOPLASM OF UPPER-OUTER QUADRANT OF LEFT BREAST IN FEMALE, ESTROGEN RECEPTOR NEGATIVE: Chronic | ICD-10-CM

## 2023-10-23 DIAGNOSIS — Z17.1 MALIGNANT NEOPLASM OF UPPER-OUTER QUADRANT OF LEFT BREAST IN FEMALE, ESTROGEN RECEPTOR NEGATIVE: Chronic | ICD-10-CM

## 2023-10-23 PROCEDURE — 99213 OFFICE O/P EST LOW 20 MIN: CPT | Mod: S$GLB,,, | Performed by: INTERNAL MEDICINE

## 2023-10-23 PROCEDURE — 3074F PR MOST RECENT SYSTOLIC BLOOD PRESSURE < 130 MM HG: ICD-10-PCS | Mod: CPTII,S$GLB,, | Performed by: INTERNAL MEDICINE

## 2023-10-23 PROCEDURE — 3008F PR BODY MASS INDEX (BMI) DOCUMENTED: ICD-10-PCS | Mod: CPTII,S$GLB,, | Performed by: INTERNAL MEDICINE

## 2023-10-23 PROCEDURE — 3078F PR MOST RECENT DIASTOLIC BLOOD PRESSURE < 80 MM HG: ICD-10-PCS | Mod: CPTII,S$GLB,, | Performed by: INTERNAL MEDICINE

## 2023-10-23 PROCEDURE — 99213 PR OFFICE/OUTPT VISIT, EST, LEVL III, 20-29 MIN: ICD-10-PCS | Mod: S$GLB,,, | Performed by: INTERNAL MEDICINE

## 2023-10-23 PROCEDURE — 3008F BODY MASS INDEX DOCD: CPT | Mod: CPTII,S$GLB,, | Performed by: INTERNAL MEDICINE

## 2023-10-23 PROCEDURE — 1159F PR MEDICATION LIST DOCUMENTED IN MEDICAL RECORD: ICD-10-PCS | Mod: CPTII,S$GLB,, | Performed by: INTERNAL MEDICINE

## 2023-10-23 PROCEDURE — 3074F SYST BP LT 130 MM HG: CPT | Mod: CPTII,S$GLB,, | Performed by: INTERNAL MEDICINE

## 2023-10-23 PROCEDURE — 1159F MED LIST DOCD IN RCRD: CPT | Mod: CPTII,S$GLB,, | Performed by: INTERNAL MEDICINE

## 2023-10-23 PROCEDURE — 3078F DIAST BP <80 MM HG: CPT | Mod: CPTII,S$GLB,, | Performed by: INTERNAL MEDICINE

## 2023-10-23 NOTE — PROGRESS NOTES
PROGRESS NOTE    Subjective:       Patient ID: Jordan Lion is a 58 y.o. female.    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  f/u breast cancer.     History of Present Illness:   Jordan Lion is a 58 y.o. female who presents for routine follow up of breast cancer.      Patient is doing well with no new complaints at this time.      Family and Social history reviewed and is unchanged from 3/30/2015.       ROS:  Review of Systems   Constitutional:  Negative for fever.   Respiratory:  Negative for shortness of breath.    Cardiovascular:  Negative for chest pain and leg swelling.   Gastrointestinal:  Negative for abdominal pain and blood in stool.   Genitourinary:  Negative for hematuria.   Skin:  Negative for rash.          Current Outpatient Medications:     b complex vitamins tablet, Take 1 tablet by mouth once daily., Disp: , Rfl:     gluc carvalho/chondro carvalho A/vit C/Mn (GLUCOSAMINE 1500 COMPLEX ORAL), Take by mouth., Disp: , Rfl:     predniSONE (DELTASONE) 5 MG tablet, Take 5 mg by mouth 2 (two) times daily., Disp: , Rfl:     vitamin D (VITAMIN D3) 1000 units Tab, Take 1,000 Units by mouth once daily., Disp: , Rfl:         Objective:       Physical Examination:     BP (!) 122/58   Pulse (!) 53   Temp 98 °F (36.7 °C)   Wt 64 kg (141 lb 3.2 oz)   SpO2 99%   BMI 25.83 kg/m²     Physical Exam  Constitutional:       Appearance: She is well-developed.   HENT:      Head: Normocephalic and atraumatic.      Right Ear: External ear normal.      Left Ear: External ear normal.   Eyes:      Conjunctiva/sclera: Conjunctivae normal.      Pupils: Pupils are equal, round, and reactive to light.   Neck:      Thyroid: No thyromegaly.      Trachea: No tracheal deviation.   Cardiovascular:      Rate and Rhythm: Normal rate and regular rhythm.       Heart sounds: Normal heart sounds.   Pulmonary:      Effort: Pulmonary effort is normal.      Breath sounds: Normal breath sounds.   Chest:       Abdominal:      General: Bowel sounds are normal. There is no distension.      Palpations: Abdomen is soft. There is no mass.      Tenderness: There is no abdominal tenderness.   Skin:     Findings: No rash.   Neurological:      Comments: Neuro intact througout   Psychiatric:         Behavior: Behavior normal.         Thought Content: Thought content normal.         Judgment: Judgment normal.         Labs:   No results found for this or any previous visit (from the past 336 hour(s)).  CMP  Sodium   Date Value Ref Range Status   04/10/2023 141 136 - 145 mmol/L Final     Potassium   Date Value Ref Range Status   04/10/2023 4.1 3.5 - 5.1 mmol/L Final     Chloride   Date Value Ref Range Status   04/10/2023 106 95 - 110 mmol/L Final     CO2   Date Value Ref Range Status   04/10/2023 26 23 - 29 mmol/L Final     Glucose   Date Value Ref Range Status   04/10/2023 88 70 - 110 mg/dL Final     BUN   Date Value Ref Range Status   04/10/2023 13 6 - 20 mg/dL Final     Creatinine   Date Value Ref Range Status   04/10/2023 0.7 0.5 - 1.4 mg/dL Final     Calcium   Date Value Ref Range Status   04/10/2023 9.3 8.7 - 10.5 mg/dL Final     Total Protein   Date Value Ref Range Status   04/10/2023 7.3 6.0 - 8.4 g/dL Final     Albumin   Date Value Ref Range Status   04/10/2023 4.3 3.5 - 5.2 g/dL Final     Total Bilirubin   Date Value Ref Range Status   04/10/2023 0.8 0.1 - 1.0 mg/dL Final     Comment:     For infants and newborns, interpretation of results should be based  on gestational age, weight and in agreement with clinical  observations.    Premature Infant recommended reference ranges:  Up to 24 hours.............<8.0 mg/dL  Up to 48 hours............<12.0 mg/dL  3-5 days..................<15.0 mg/dL  6-29 days.................<15.0 mg/dL       Alkaline Phosphatase   Date Value Ref Range  "Status   04/10/2023 82 55 - 135 U/L Final     AST   Date Value Ref Range Status   04/10/2023 22 10 - 40 U/L Final     ALT   Date Value Ref Range Status   04/10/2023 20 10 - 44 U/L Final     Anion Gap   Date Value Ref Range Status   04/10/2023 9 8 - 16 mmol/L Final     eGFR if    Date Value Ref Range Status   01/07/2022 >60.0 >60 mL/min/1.73 m^2 Final     eGFR if non    Date Value Ref Range Status   01/07/2022 >60.0 >60 mL/min/1.73 m^2 Final     Comment:     Calculation used to obtain the estimated glomerular filtration  rate (eGFR) is the CKD-EPI equation.        No results found for: "CEA"  No results found for: "PSA"        Assessment/Plan:     Problem List Items Addressed This Visit       Malignant neoplasm of upper-outer quadrant of left breast in female, estrogen receptor negative (Chronic)     Ms. Lion is doing well.  Exam is negative and she has no sign of recurrence at this time.  Will continue to monitor with six month follow up for exams.  Discussed this today.           Discussion:     Follow up in about 6 months (around 4/23/2024).      Electronically signed by Chase Colon                "

## 2023-10-23 NOTE — ASSESSMENT & PLAN NOTE
Ms. Lion is doing well.  Exam is negative and she has no sign of recurrence at this time.  Will continue to monitor with six month follow up for exams.  Discussed this today.

## 2023-11-30 ENCOUNTER — LAB VISIT (OUTPATIENT)
Dept: LAB | Facility: HOSPITAL | Age: 58
End: 2023-11-30
Attending: INTERNAL MEDICINE
Payer: COMMERCIAL

## 2023-11-30 DIAGNOSIS — D51.0 PERNICIOUS ANEMIA: ICD-10-CM

## 2023-11-30 DIAGNOSIS — E78.2 MIXED HYPERLIPIDEMIA: ICD-10-CM

## 2023-11-30 DIAGNOSIS — D50.0 IRON DEFICIENCY ANEMIA SECONDARY TO BLOOD LOSS (CHRONIC): ICD-10-CM

## 2023-11-30 DIAGNOSIS — E03.4 IDIOPATHIC ATROPHIC HYPOTHYROIDISM: ICD-10-CM

## 2023-11-30 DIAGNOSIS — R10.11 ABDOMINAL PAIN, RIGHT UPPER QUADRANT: Primary | ICD-10-CM

## 2023-11-30 LAB
ALBUMIN SERPL BCP-MCNC: 4.1 G/DL (ref 3.5–5.2)
ALP SERPL-CCNC: 80 U/L (ref 55–135)
ALT SERPL W/O P-5'-P-CCNC: 12 U/L (ref 10–44)
ANION GAP SERPL CALC-SCNC: 5 MMOL/L (ref 8–16)
AST SERPL-CCNC: 14 U/L (ref 10–40)
BASOPHILS # BLD AUTO: 0.03 K/UL (ref 0–0.2)
BASOPHILS NFR BLD: 0.6 % (ref 0–1.9)
BILIRUB SERPL-MCNC: 0.5 MG/DL (ref 0.1–1)
BUN SERPL-MCNC: 17 MG/DL (ref 6–20)
CALCIUM SERPL-MCNC: 9.3 MG/DL (ref 8.7–10.5)
CHLORIDE SERPL-SCNC: 105 MMOL/L (ref 95–110)
CHOLEST SERPL-MCNC: 195 MG/DL (ref 120–199)
CHOLEST/HDLC SERPL: 4.2 {RATIO} (ref 2–5)
CO2 SERPL-SCNC: 27 MMOL/L (ref 23–29)
CREAT SERPL-MCNC: 0.7 MG/DL (ref 0.5–1.4)
DIFFERENTIAL METHOD BLD: ABNORMAL
EOSINOPHIL # BLD AUTO: 0.1 K/UL (ref 0–0.5)
EOSINOPHIL NFR BLD: 1.4 % (ref 0–8)
ERYTHROCYTE [DISTWIDTH] IN BLOOD BY AUTOMATED COUNT: 12.8 % (ref 11.5–14.5)
EST. GFR  (NO RACE VARIABLE): >60 ML/MIN/1.73 M^2
GLUCOSE SERPL-MCNC: 85 MG/DL (ref 70–110)
HCT VFR BLD AUTO: 39 % (ref 37–48.5)
HDLC SERPL-MCNC: 46 MG/DL (ref 40–75)
HDLC SERPL: 23.6 % (ref 20–50)
HGB BLD-MCNC: 12.7 G/DL (ref 12–16)
IMM GRANULOCYTES # BLD AUTO: 0.01 K/UL (ref 0–0.04)
IMM GRANULOCYTES NFR BLD AUTO: 0.2 % (ref 0–0.5)
LDLC SERPL CALC-MCNC: 117 MG/DL (ref 63–159)
LYMPHOCYTES # BLD AUTO: 2.7 K/UL (ref 1–4.8)
LYMPHOCYTES NFR BLD: 53.4 % (ref 18–48)
MCH RBC QN AUTO: 31.1 PG (ref 27–31)
MCHC RBC AUTO-ENTMCNC: 32.6 G/DL (ref 32–36)
MCV RBC AUTO: 96 FL (ref 82–98)
MONOCYTES # BLD AUTO: 0.4 K/UL (ref 0.3–1)
MONOCYTES NFR BLD: 7.3 % (ref 4–15)
NEUTROPHILS # BLD AUTO: 1.9 K/UL (ref 1.8–7.7)
NEUTROPHILS NFR BLD: 37.1 % (ref 38–73)
NONHDLC SERPL-MCNC: 149 MG/DL
NRBC BLD-RTO: 0 /100 WBC
PLATELET # BLD AUTO: 246 K/UL (ref 150–450)
PMV BLD AUTO: 9.7 FL (ref 9.2–12.9)
POTASSIUM SERPL-SCNC: 3.8 MMOL/L (ref 3.5–5.1)
PROT SERPL-MCNC: 6.5 G/DL (ref 6–8.4)
RBC # BLD AUTO: 4.08 M/UL (ref 4–5.4)
SODIUM SERPL-SCNC: 137 MMOL/L (ref 136–145)
T4 FREE SERPL-MCNC: 0.75 NG/DL (ref 0.71–1.51)
TRIGL SERPL-MCNC: 160 MG/DL (ref 30–150)
TSH SERPL DL<=0.005 MIU/L-ACNC: 1.83 UIU/ML (ref 0.34–5.6)
VIT B12 SERPL-MCNC: 351 PG/ML (ref 210–950)
WBC # BLD AUTO: 5.09 K/UL (ref 3.9–12.7)

## 2023-11-30 PROCEDURE — 85025 COMPLETE CBC W/AUTO DIFF WBC: CPT | Performed by: INTERNAL MEDICINE

## 2023-11-30 PROCEDURE — 80061 LIPID PANEL: CPT | Performed by: INTERNAL MEDICINE

## 2023-11-30 PROCEDURE — 82607 VITAMIN B-12: CPT | Performed by: INTERNAL MEDICINE

## 2023-11-30 PROCEDURE — 80053 COMPREHEN METABOLIC PANEL: CPT | Performed by: INTERNAL MEDICINE

## 2023-11-30 PROCEDURE — 84439 ASSAY OF FREE THYROXINE: CPT | Performed by: INTERNAL MEDICINE

## 2023-11-30 PROCEDURE — 84443 ASSAY THYROID STIM HORMONE: CPT | Performed by: INTERNAL MEDICINE

## 2023-11-30 PROCEDURE — 83921 ORGANIC ACID SINGLE QUANT: CPT | Performed by: INTERNAL MEDICINE

## 2023-12-04 LAB — METHLYMALONIC ACID: 116 NMOL/L (ref 0–378)

## 2023-12-29 NOTE — PROGRESS NOTES
PROGRESS NOTE    Subjective:       Patient ID: Jordan Lion is a 58 y.o. female.    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     History of Present Illness:   Jordan Lion is a 58 y.o. female who presents for routine follow up of breast cancer.      Patient is here for an add on visit for a new area of pain/discomfort she noticed about 3-4 weeks ago. She has been having left burning pain under the right breast. She states she has had some numbness I  the past but never burning. There is no redness and the area is palpably slightly bigger on the left than the right.      Family and Social history reviewed and is unchanged from 3/30/2015.       ROS:  Review of Systems   Constitutional:  Negative for fever.   Respiratory:  Negative for shortness of breath.    Cardiovascular:  Negative for chest pain and leg swelling.   Gastrointestinal:  Negative for abdominal pain and blood in stool.   Genitourinary:  Negative for hematuria.   Skin:  Negative for rash.          Current Outpatient Medications:     b complex vitamins tablet, Take 1 tablet by mouth once daily., Disp: , Rfl:     gluc carvalho/chondro carvalho A/vit C/Mn (GLUCOSAMINE 1500 COMPLEX ORAL), Take by mouth., Disp: , Rfl:     predniSONE (DELTASONE) 5 MG tablet, Take 5 mg by mouth 2 (two) times daily., Disp: , Rfl:     vitamin D (VITAMIN D3) 1000 units Tab, Take 1,000 Units by mouth once daily., Disp: , Rfl:         Objective:       Physical Examination:     BP (!) 143/76   Pulse (!) 52   Temp 98 °F (36.7 °C)   Resp 16   Wt 63.5 kg (140 lb 1.6 oz)   BMI 25.62 kg/m²     Physical Exam  Constitutional:       Appearance: Normal appearance. She is well-developed.   HENT:      Head: Normocephalic and atraumatic.      Right Ear: External ear normal.      Left Ear: External ear normal.      Nose:  Nose normal.      Mouth/Throat:      Mouth: Mucous membranes are moist.   Eyes:      Conjunctiva/sclera: Conjunctivae normal.      Pupils: Pupils are equal, round, and reactive to light.   Neck:      Thyroid: No thyromegaly.      Trachea: No tracheal deviation.   Cardiovascular:      Rate and Rhythm: Normal rate and regular rhythm.      Heart sounds: Normal heart sounds.   Pulmonary:      Effort: Pulmonary effort is normal.      Breath sounds: Normal breath sounds.   Chest:   Breasts:     Right: Absent.      Left: Absent.       Abdominal:      General: Bowel sounds are normal. There is no distension.      Palpations: Abdomen is soft. There is no mass.      Tenderness: There is no abdominal tenderness.   Lymphadenopathy:      Upper Body:      Right upper body: No supraclavicular, axillary or pectoral adenopathy.      Left upper body: No supraclavicular, axillary or pectoral adenopathy.   Skin:     General: Skin is warm and dry.      Findings: No rash.   Neurological:      General: No focal deficit present.      Mental Status: She is alert and oriented to person, place, and time.      Comments: Neuro intact througout   Psychiatric:         Behavior: Behavior normal.         Thought Content: Thought content normal.         Judgment: Judgment normal.         Labs:   No results found for this or any previous visit (from the past 336 hour(s)).  CMP  Sodium   Date Value Ref Range Status   11/30/2023 137 136 - 145 mmol/L Final     Potassium   Date Value Ref Range Status   11/30/2023 3.8 3.5 - 5.1 mmol/L Final     Chloride   Date Value Ref Range Status   11/30/2023 105 95 - 110 mmol/L Final     CO2   Date Value Ref Range Status   11/30/2023 27 23 - 29 mmol/L Final     Glucose   Date Value Ref Range Status   11/30/2023 85 70 - 110 mg/dL Final     BUN   Date Value Ref Range Status   11/30/2023 17 6 - 20 mg/dL Final     Creatinine   Date Value Ref Range Status   11/30/2023 0.7 0.5 - 1.4 mg/dL Final     Calcium   Date Value Ref  "Range Status   11/30/2023 9.3 8.7 - 10.5 mg/dL Final     Total Protein   Date Value Ref Range Status   11/30/2023 6.5 6.0 - 8.4 g/dL Final     Albumin   Date Value Ref Range Status   11/30/2023 4.1 3.5 - 5.2 g/dL Final     Total Bilirubin   Date Value Ref Range Status   11/30/2023 0.5 0.1 - 1.0 mg/dL Final     Comment:     For infants and newborns, interpretation of results should be based  on gestational age, weight and in agreement with clinical  observations.    Premature Infant recommended reference ranges:  Up to 24 hours.............<8.0 mg/dL  Up to 48 hours............<12.0 mg/dL  3-5 days..................<15.0 mg/dL  6-29 days.................<15.0 mg/dL       Alkaline Phosphatase   Date Value Ref Range Status   11/30/2023 80 55 - 135 U/L Final     AST   Date Value Ref Range Status   11/30/2023 14 10 - 40 U/L Final     ALT   Date Value Ref Range Status   11/30/2023 12 10 - 44 U/L Final     Anion Gap   Date Value Ref Range Status   11/30/2023 5 (L) 8 - 16 mmol/L Final     eGFR if    Date Value Ref Range Status   01/07/2022 >60.0 >60 mL/min/1.73 m^2 Final     eGFR if non    Date Value Ref Range Status   01/07/2022 >60.0 >60 mL/min/1.73 m^2 Final     Comment:     Calculation used to obtain the estimated glomerular filtration  rate (eGFR) is the CKD-EPI equation.        No results found for: "CEA"  No results found for: "PSA"        Assessment/Plan:     Problem List Items Addressed This Visit          Oncology    Malignant neoplasm of upper-outer quadrant of left breast in female, estrogen receptor negative - Primary (Chronic)    Relevant Orders    US Breast Left Complete    CEA    CA 27.29    Cancer Antigen 15-3     Left breast cancer- Left Breast US and tumor markers.  2. Left breast burning- Left breast US and tumor markers.     Discussion:     Follow up in about 4 weeks (around 1/30/2024) for with me.      Electronically signed by Kim Manuel, MSN, APRN, AGNP-C, " OCN

## 2024-01-02 ENCOUNTER — OFFICE VISIT (OUTPATIENT)
Dept: HEMATOLOGY/ONCOLOGY | Facility: CLINIC | Age: 59
End: 2024-01-02
Payer: COMMERCIAL

## 2024-01-02 VITALS
BODY MASS INDEX: 25.62 KG/M2 | HEART RATE: 52 BPM | DIASTOLIC BLOOD PRESSURE: 76 MMHG | SYSTOLIC BLOOD PRESSURE: 143 MMHG | TEMPERATURE: 98 F | WEIGHT: 140.13 LBS | RESPIRATION RATE: 16 BRPM

## 2024-01-02 DIAGNOSIS — C50.412 MALIGNANT NEOPLASM OF UPPER-OUTER QUADRANT OF LEFT BREAST IN FEMALE, ESTROGEN RECEPTOR NEGATIVE: Primary | ICD-10-CM

## 2024-01-02 DIAGNOSIS — Z17.1 MALIGNANT NEOPLASM OF UPPER-OUTER QUADRANT OF LEFT BREAST IN FEMALE, ESTROGEN RECEPTOR NEGATIVE: Primary | ICD-10-CM

## 2024-01-02 DIAGNOSIS — N64.4 BREAST PAIN, LEFT: ICD-10-CM

## 2024-01-02 PROCEDURE — 1159F MED LIST DOCD IN RCRD: CPT | Mod: CPTII,S$GLB,, | Performed by: NURSE PRACTITIONER

## 2024-01-02 PROCEDURE — 1160F RVW MEDS BY RX/DR IN RCRD: CPT | Mod: CPTII,S$GLB,, | Performed by: NURSE PRACTITIONER

## 2024-01-02 PROCEDURE — 3078F DIAST BP <80 MM HG: CPT | Mod: CPTII,S$GLB,, | Performed by: NURSE PRACTITIONER

## 2024-01-02 PROCEDURE — 3008F BODY MASS INDEX DOCD: CPT | Mod: CPTII,S$GLB,, | Performed by: NURSE PRACTITIONER

## 2024-01-02 PROCEDURE — 3077F SYST BP >= 140 MM HG: CPT | Mod: CPTII,S$GLB,, | Performed by: NURSE PRACTITIONER

## 2024-01-02 PROCEDURE — 99213 OFFICE O/P EST LOW 20 MIN: CPT | Mod: S$GLB,,, | Performed by: NURSE PRACTITIONER

## 2024-01-03 ENCOUNTER — LAB VISIT (OUTPATIENT)
Dept: LAB | Facility: HOSPITAL | Age: 59
End: 2024-01-03
Attending: NURSE PRACTITIONER
Payer: COMMERCIAL

## 2024-01-03 DIAGNOSIS — Z17.1 MALIGNANT NEOPLASM OF UPPER-OUTER QUADRANT OF LEFT BREAST IN FEMALE, ESTROGEN RECEPTOR NEGATIVE: ICD-10-CM

## 2024-01-03 DIAGNOSIS — C50.412 MALIGNANT NEOPLASM OF UPPER-OUTER QUADRANT OF LEFT BREAST IN FEMALE, ESTROGEN RECEPTOR NEGATIVE: ICD-10-CM

## 2024-01-03 LAB — CEA SERPL-MCNC: 3.5 NG/ML (ref 0–5)

## 2024-01-03 PROCEDURE — 36415 COLL VENOUS BLD VENIPUNCTURE: CPT | Performed by: NURSE PRACTITIONER

## 2024-01-03 PROCEDURE — 82378 CARCINOEMBRYONIC ANTIGEN: CPT | Performed by: NURSE PRACTITIONER

## 2024-01-03 PROCEDURE — 86300 IMMUNOASSAY TUMOR CA 15-3: CPT | Mod: 91 | Performed by: NURSE PRACTITIONER

## 2024-01-03 PROCEDURE — 86300 IMMUNOASSAY TUMOR CA 15-3: CPT | Performed by: NURSE PRACTITIONER

## 2024-01-04 DIAGNOSIS — N64.4 BREAST PAIN, LEFT: ICD-10-CM

## 2024-01-04 DIAGNOSIS — C50.412 MALIGNANT NEOPLASM OF UPPER-OUTER QUADRANT OF LEFT BREAST IN FEMALE, ESTROGEN RECEPTOR NEGATIVE: Primary | ICD-10-CM

## 2024-01-04 DIAGNOSIS — Z17.1 MALIGNANT NEOPLASM OF UPPER-OUTER QUADRANT OF LEFT BREAST IN FEMALE, ESTROGEN RECEPTOR NEGATIVE: Primary | ICD-10-CM

## 2024-01-04 LAB
CANCER AG15-3 SERPL-ACNC: 6.4 U/ML (ref 0–25)
CANCER AG27-29 SERPL-ACNC: <9 U/ML (ref 0–38.6)

## 2024-02-01 ENCOUNTER — TELEPHONE (OUTPATIENT)
Dept: HEMATOLOGY/ONCOLOGY | Facility: CLINIC | Age: 59
End: 2024-02-01

## 2024-02-01 ENCOUNTER — HOSPITAL ENCOUNTER (OUTPATIENT)
Dept: RADIOLOGY | Facility: HOSPITAL | Age: 59
Discharge: HOME | End: 2024-02-01
Attending: NURSE PRACTITIONER
Payer: COMMERCIAL

## 2024-02-01 DIAGNOSIS — Z17.1 MALIGNANT NEOPLASM OF UPPER-OUTER QUADRANT OF LEFT BREAST IN FEMALE, ESTROGEN RECEPTOR NEGATIVE: ICD-10-CM

## 2024-02-01 DIAGNOSIS — C50.412 MALIGNANT NEOPLASM OF UPPER-OUTER QUADRANT OF LEFT BREAST IN FEMALE, ESTROGEN RECEPTOR NEGATIVE: ICD-10-CM

## 2024-02-01 PROCEDURE — 76642 ULTRASOUND BREAST LIMITED: CPT | Mod: TC,PO,LT

## 2024-02-01 NOTE — TELEPHONE ENCOUNTER
----- Message from CANDACE Clark sent at 2/1/2024  9:30 AM CST -----  Can you let her know the US was negative. Fu as scheduled      Kim      Spoke to patient ans gave her the above news. She voiced understanding. Will F/U as scheduled on 2/5/24.

## 2024-04-21 NOTE — ASSESSMENT & PLAN NOTE
Patient is doing well.  Exam is negative and she is currently INDIO.  Will continue with every six month follow up and discussed this today.       TEA ECHAVARRIA  57y Female    Patient is a 57y old  Female who presents with a chief complaint of SOB    INTERVAL HPI/OVERNIGHT EVENTS:    ROS: Pt is seen and examined at bedside. Pt is unable to provide ROS.    Overnight events: No acute events overnight.    Vital Signs Last 24 Hrs  T(C): 36.6 (21 Apr 2024 12:46), Max: 36.8 (20 Apr 2024 21:07)  T(F): 97.8 (21 Apr 2024 12:46), Max: 98.3 (20 Apr 2024 21:07)  HR: 100 (21 Apr 2024 12:46) (74 - 100)  BP: 97/50 (21 Apr 2024 12:46) (97/50 - 121/82)  BP(mean): --  RR: 18 (21 Apr 2024 12:46) (18 - 18)  SpO2: 97% (21 Apr 2024 12:46) (94% - 97%)    Parameters below as of 20 Apr 2024 21:07  Patient On (Oxygen Delivery Method): nasal cannula  O2 Flow (L/min): 3      No Known Allergies      MEDICATIONS  (STANDING):  albuterol/ipratropium for Nebulization 3 milliLiter(s) Nebulizer every 6 hours  AQUAPHOR (petrolatum Ointment) 1 Application(s) Topical two times a day  artificial  tears Solution 1 Drop(s) Both EYES two times a day  calcium carbonate   1250 mG (OsCal) 1 Tablet(s) Oral daily  chlorhexidine 2% Cloths 1 Application(s) Topical daily  enoxaparin Injectable 30 milliGRAM(s) SubCutaneous every 24 hours  famotidine    Tablet 20 milliGRAM(s) Oral two times a day  gabapentin Solution 300 milliGRAM(s) Oral two times a day  hydrocortisone 1% Cream 1 Application(s) Topical daily  lactated ringers Bolus 500 milliLiter(s) IV Bolus once  levETIRAcetam  Solution 750 milliGRAM(s) Oral two times a day  meropenem  IVPB 1000 milliGRAM(s) IV Intermittent every 8 hours  midodrine 10 milliGRAM(s) Oral every 8 hours  raloxifene 60 milliGRAM(s) Oral daily  scopolamine 1 mG/72 Hr(s) Patch 1 Patch Transdermal every 72 hours  senna 2 Tablet(s) Oral at bedtime  silver sulfADIAZINE 1% Cream 1 Application(s) Topical two times a day    MEDICATIONS  (PRN):  acetaminophen     Tablet .. 650 milliGRAM(s) Oral every 6 hours PRN Temp greater or equal to 38C (100.4F), Mild Pain (1 - 3)  aluminum hydroxide/magnesium hydroxide/simethicone Suspension 30 milliLiter(s) Oral every 4 hours PRN Dyspepsia  melatonin 3 milliGRAM(s) Oral at bedtime PRN Insomnia  ondansetron Injectable 4 milliGRAM(s) IV Push every 8 hours PRN Nausea and/or Vomiting      PHYSICAL EXAM:  General Appearance: NAD on NC, normal for age and gender. 	  Neck: Normal JVP, no bruit.   Eyes: Conjunctiva clear, Extra Ocular muscles intact. No scleral icterus.  ENMT: Moist oral mucosa. No oral lesion.  Cardiovascular: Regular rate and rhythm S1 S2, No JVD, No murmurs.  Respiratory: Bilateral rhonchi.  Psychiatry: Arousable but sleeping at the time of eam. Calm.  Gastrointestinal:  Soft, Non-tender, Non-distended.  Neurologic: Unable to assess. Contracted.  Musculoskeletal/ extremities: No joint swelling. No clubbing, cyanosis or edema. Atrophy of LE.  Vascular: Peripheral pulses palpable bilaterally.  Skin/Integumen: No rashes, No ecchymoses, No cyanosis.      LABS:                        9.6    6.31  )-----------( 375      ( 21 Apr 2024 06:07 )             31.7     04-21    136  |  98  |  12  ----------------------------<  109<H>  5.0   |  28  |  0.5<L>    Ca    8.8      21 Apr 2024 06:07  Mg     2.3     04-21    TPro  5.9<L>  /  Alb  2.9<L>  /  TBili  <0.2  /  DBili  x   /  AST  21  /  ALT  11  /  AlkPhos  104  04-21      Culture - Blood (collected 19 Apr 2024 04:27)  Source: .Blood Blood  Preliminary Report (21 Apr 2024 10:02):    No growth at 48 Hours    RADIOLOGY & ADDITIONAL TESTS:

## 2024-04-22 ENCOUNTER — OFFICE VISIT (OUTPATIENT)
Dept: HEMATOLOGY/ONCOLOGY | Facility: CLINIC | Age: 59
End: 2024-04-22
Payer: COMMERCIAL

## 2024-04-22 VITALS
TEMPERATURE: 97 F | RESPIRATION RATE: 17 BRPM | HEART RATE: 56 BPM | SYSTOLIC BLOOD PRESSURE: 137 MMHG | DIASTOLIC BLOOD PRESSURE: 63 MMHG | WEIGHT: 143.69 LBS | BODY MASS INDEX: 26.28 KG/M2

## 2024-04-22 DIAGNOSIS — C50.412 MALIGNANT NEOPLASM OF UPPER-OUTER QUADRANT OF LEFT BREAST IN FEMALE, ESTROGEN RECEPTOR NEGATIVE: Primary | Chronic | ICD-10-CM

## 2024-04-22 DIAGNOSIS — Z17.1 MALIGNANT NEOPLASM OF UPPER-OUTER QUADRANT OF LEFT BREAST IN FEMALE, ESTROGEN RECEPTOR NEGATIVE: Primary | Chronic | ICD-10-CM

## 2024-04-22 PROCEDURE — 99213 OFFICE O/P EST LOW 20 MIN: CPT | Mod: S$GLB,,, | Performed by: INTERNAL MEDICINE

## 2024-04-22 PROCEDURE — 1159F MED LIST DOCD IN RCRD: CPT | Mod: CPTII,S$GLB,, | Performed by: INTERNAL MEDICINE

## 2024-04-22 PROCEDURE — 3008F BODY MASS INDEX DOCD: CPT | Mod: CPTII,S$GLB,, | Performed by: INTERNAL MEDICINE

## 2024-04-22 PROCEDURE — 3075F SYST BP GE 130 - 139MM HG: CPT | Mod: CPTII,S$GLB,, | Performed by: INTERNAL MEDICINE

## 2024-04-22 PROCEDURE — 3078F DIAST BP <80 MM HG: CPT | Mod: CPTII,S$GLB,, | Performed by: INTERNAL MEDICINE

## 2024-04-22 NOTE — ASSESSMENT & PLAN NOTE
Patient is doing well and her exam is negative.  Note is made of negative u/s done in February this year.  Will continue to monitor this area but no clear tumor is found.  Continue to see her every six months.

## 2024-04-22 NOTE — PROGRESS NOTES
PROGRESS NOTE    Subjective:       Patient ID: Jordan Lion is a 58 y.o. female.    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  f/u breast cancer.     History of Present Illness:   Jordan Lion is a 58 y.o. female who presents for routine follow up of breast cancer.      Jordan is doing well today.  No new complaints.     Family and Social history reviewed and is unchanged from 3/30/2015.       ROS:  Review of Systems   Constitutional:  Negative for fever.   Respiratory:  Negative for shortness of breath.    Cardiovascular:  Negative for chest pain and leg swelling.   Gastrointestinal:  Negative for abdominal pain and blood in stool.   Genitourinary:  Negative for hematuria.   Skin:  Negative for rash.          Current Outpatient Medications:     b complex vitamins tablet, Take 1 tablet by mouth once daily., Disp: , Rfl:     gluc carvalho/chondro carvalho A/vit C/Mn (GLUCOSAMINE 1500 COMPLEX ORAL), Take by mouth., Disp: , Rfl:     predniSONE (DELTASONE) 5 MG tablet, Take 5 mg by mouth 2 (two) times daily., Disp: , Rfl:     vitamin D (VITAMIN D3) 1000 units Tab, Take 1,000 Units by mouth once daily., Disp: , Rfl:         Objective:       Physical Examination:     /63   Pulse (!) 56   Temp 97.3 °F (36.3 °C)   Resp 17   Wt 65.2 kg (143 lb 11.2 oz)   LMP 11/13/2015   BMI 26.28 kg/m²     Physical Exam  Constitutional:       Appearance: She is well-developed.   HENT:      Head: Normocephalic and atraumatic.      Right Ear: External ear normal.      Left Ear: External ear normal.   Eyes:      Conjunctiva/sclera: Conjunctivae normal.      Pupils: Pupils are equal, round, and reactive to light.   Neck:      Thyroid: No thyromegaly.      Trachea: No tracheal deviation.   Cardiovascular:      Rate and Rhythm: Normal rate and regular rhythm.       Heart sounds: Normal heart sounds.   Pulmonary:      Effort: Pulmonary effort is normal.      Breath sounds: Normal breath sounds.   Chest:       Abdominal:      General: Bowel sounds are normal. There is no distension.      Palpations: Abdomen is soft. There is no mass.      Tenderness: There is no abdominal tenderness.   Skin:     Findings: No rash.   Neurological:      Comments: Neuro intact througout   Psychiatric:         Behavior: Behavior normal.         Thought Content: Thought content normal.         Judgment: Judgment normal.         Labs:   No results found for this or any previous visit (from the past 336 hour(s)).  CMP  Sodium   Date Value Ref Range Status   11/30/2023 137 136 - 145 mmol/L Final     Potassium   Date Value Ref Range Status   11/30/2023 3.8 3.5 - 5.1 mmol/L Final     Chloride   Date Value Ref Range Status   11/30/2023 105 95 - 110 mmol/L Final     CO2   Date Value Ref Range Status   11/30/2023 27 23 - 29 mmol/L Final     Glucose   Date Value Ref Range Status   11/30/2023 85 70 - 110 mg/dL Final     BUN   Date Value Ref Range Status   11/30/2023 17 6 - 20 mg/dL Final     Creatinine   Date Value Ref Range Status   11/30/2023 0.7 0.5 - 1.4 mg/dL Final     Calcium   Date Value Ref Range Status   11/30/2023 9.3 8.7 - 10.5 mg/dL Final     Total Protein   Date Value Ref Range Status   11/30/2023 6.5 6.0 - 8.4 g/dL Final     Albumin   Date Value Ref Range Status   11/30/2023 4.1 3.5 - 5.2 g/dL Final     Total Bilirubin   Date Value Ref Range Status   11/30/2023 0.5 0.1 - 1.0 mg/dL Final     Comment:     For infants and newborns, interpretation of results should be based  on gestational age, weight and in agreement with clinical  observations.    Premature Infant recommended reference ranges:  Up to 24 hours.............<8.0 mg/dL  Up to 48 hours............<12.0 mg/dL  3-5 days..................<15.0 mg/dL  6-29 days.................<15.0 mg/dL       Alkaline Phosphatase   Date Value Ref  "Range Status   11/30/2023 80 55 - 135 U/L Final     AST   Date Value Ref Range Status   11/30/2023 14 10 - 40 U/L Final     ALT   Date Value Ref Range Status   11/30/2023 12 10 - 44 U/L Final     Anion Gap   Date Value Ref Range Status   11/30/2023 5 (L) 8 - 16 mmol/L Final     eGFR if    Date Value Ref Range Status   01/07/2022 >60.0 >60 mL/min/1.73 m^2 Final     eGFR if non    Date Value Ref Range Status   01/07/2022 >60.0 >60 mL/min/1.73 m^2 Final     Comment:     Calculation used to obtain the estimated glomerular filtration  rate (eGFR) is the CKD-EPI equation.        Lab Results   Component Value Date    CEA 3.5 01/03/2024     No results found for: "PSA"        Assessment/Plan:     Problem List Items Addressed This Visit       Malignant neoplasm of upper-outer quadrant of left breast in female, estrogen receptor negative - Primary (Chronic)     Patient is doing well and her exam is negative.  Note is made of negative u/s done in February this year.  Will continue to monitor this area but no clear tumor is found.  Continue to see her every six months.             Discussion:     Follow up in about 6 months (around 10/22/2024).      Electronically signed by Chase Colon                "

## 2024-05-31 ENCOUNTER — LAB VISIT (OUTPATIENT)
Dept: LAB | Facility: HOSPITAL | Age: 59
End: 2024-05-31
Attending: INTERNAL MEDICINE
Payer: COMMERCIAL

## 2024-05-31 DIAGNOSIS — R10.11 ABDOMINAL PAIN, RIGHT UPPER QUADRANT: Primary | ICD-10-CM

## 2024-05-31 DIAGNOSIS — E78.2 MIXED HYPERLIPIDEMIA: ICD-10-CM

## 2024-05-31 LAB
ALBUMIN SERPL BCP-MCNC: 4.3 G/DL (ref 3.5–5.2)
ALP SERPL-CCNC: 78 U/L (ref 55–135)
ALT SERPL W/O P-5'-P-CCNC: 14 U/L (ref 10–44)
ANION GAP SERPL CALC-SCNC: 7 MMOL/L (ref 8–16)
AST SERPL-CCNC: 16 U/L (ref 10–40)
BILIRUB SERPL-MCNC: 0.5 MG/DL (ref 0.1–1)
BUN SERPL-MCNC: 14 MG/DL (ref 6–20)
CALCIUM SERPL-MCNC: 9.2 MG/DL (ref 8.7–10.5)
CHLORIDE SERPL-SCNC: 109 MMOL/L (ref 95–110)
CHOLEST SERPL-MCNC: 188 MG/DL (ref 120–199)
CHOLEST/HDLC SERPL: 4.6 {RATIO} (ref 2–5)
CO2 SERPL-SCNC: 26 MMOL/L (ref 23–29)
CREAT SERPL-MCNC: 0.6 MG/DL (ref 0.5–1.4)
EST. GFR  (NO RACE VARIABLE): >60 ML/MIN/1.73 M^2
GLUCOSE SERPL-MCNC: 87 MG/DL (ref 70–110)
HDLC SERPL-MCNC: 41 MG/DL (ref 40–75)
HDLC SERPL: 21.8 % (ref 20–50)
LDLC SERPL CALC-MCNC: 107 MG/DL (ref 63–159)
NONHDLC SERPL-MCNC: 147 MG/DL
POTASSIUM SERPL-SCNC: 4.1 MMOL/L (ref 3.5–5.1)
PROT SERPL-MCNC: 7 G/DL (ref 6–8.4)
SODIUM SERPL-SCNC: 142 MMOL/L (ref 136–145)
TRIGL SERPL-MCNC: 200 MG/DL (ref 30–150)

## 2024-05-31 PROCEDURE — 80061 LIPID PANEL: CPT | Performed by: INTERNAL MEDICINE

## 2024-05-31 PROCEDURE — 36415 COLL VENOUS BLD VENIPUNCTURE: CPT | Performed by: INTERNAL MEDICINE

## 2024-05-31 PROCEDURE — 80053 COMPREHEN METABOLIC PANEL: CPT | Performed by: INTERNAL MEDICINE

## 2024-10-11 ENCOUNTER — TELEPHONE (OUTPATIENT)
Facility: CLINIC | Age: 59
End: 2024-10-11
Payer: COMMERCIAL

## 2024-10-21 ENCOUNTER — OFFICE VISIT (OUTPATIENT)
Facility: CLINIC | Age: 59
End: 2024-10-21
Payer: COMMERCIAL

## 2024-10-21 VITALS
RESPIRATION RATE: 17 BRPM | BODY MASS INDEX: 26.16 KG/M2 | SYSTOLIC BLOOD PRESSURE: 118 MMHG | WEIGHT: 143 LBS | TEMPERATURE: 98 F | HEART RATE: 57 BPM | DIASTOLIC BLOOD PRESSURE: 56 MMHG

## 2024-10-21 DIAGNOSIS — C50.412 MALIGNANT NEOPLASM OF UPPER-OUTER QUADRANT OF LEFT BREAST IN FEMALE, ESTROGEN RECEPTOR NEGATIVE: Primary | Chronic | ICD-10-CM

## 2024-10-21 DIAGNOSIS — Z17.1 MALIGNANT NEOPLASM OF UPPER-OUTER QUADRANT OF LEFT BREAST IN FEMALE, ESTROGEN RECEPTOR NEGATIVE: Primary | Chronic | ICD-10-CM

## 2024-10-21 PROCEDURE — 3074F SYST BP LT 130 MM HG: CPT | Mod: CPTII,S$GLB,, | Performed by: INTERNAL MEDICINE

## 2024-10-21 PROCEDURE — 3008F BODY MASS INDEX DOCD: CPT | Mod: CPTII,S$GLB,, | Performed by: INTERNAL MEDICINE

## 2024-10-21 PROCEDURE — 3078F DIAST BP <80 MM HG: CPT | Mod: CPTII,S$GLB,, | Performed by: INTERNAL MEDICINE

## 2024-10-21 PROCEDURE — 99999 PR PBB SHADOW E&M-EST. PATIENT-LVL III: CPT | Mod: PBBFAC,,, | Performed by: INTERNAL MEDICINE

## 2024-10-21 PROCEDURE — 1159F MED LIST DOCD IN RCRD: CPT | Mod: CPTII,S$GLB,, | Performed by: INTERNAL MEDICINE

## 2024-10-21 PROCEDURE — G2211 COMPLEX E/M VISIT ADD ON: HCPCS | Mod: S$GLB,,, | Performed by: INTERNAL MEDICINE

## 2024-10-21 PROCEDURE — 99213 OFFICE O/P EST LOW 20 MIN: CPT | Mod: S$GLB,,, | Performed by: INTERNAL MEDICINE

## 2024-10-21 NOTE — ASSESSMENT & PLAN NOTE
Patient is doing well and appears INDIO at this time.  Exam is negative and she has no other new concerning signs or symptoms of disease.  Will continue to see her on a six month follow up schedule.

## 2024-10-21 NOTE — PROGRESS NOTES
PROGRESS NOTE    Subjective:       Patient ID: Jordan Lion is a 59 y.o. female.    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  f/u breast cancer.     History of Present Illness:   Jordan Lion is a 59 y.o. female who presents for routine follow up of breast cancer.      Jordan is doing well today.  No new complaints.     Family and Social history reviewed and is unchanged from 3/30/2015.       ROS:         Current Outpatient Medications:     b complex vitamins tablet, Take 1 tablet by mouth once daily., Disp: , Rfl:     gluc carvalho/chondro carvalho A/vit C/Mn (GLUCOSAMINE 1500 COMPLEX ORAL), Take by mouth., Disp: , Rfl:     predniSONE (DELTASONE) 5 MG tablet, Take 5 mg by mouth 2 (two) times daily., Disp: , Rfl:     vitamin D (VITAMIN D3) 1000 units Tab, Take 1,000 Units by mouth once daily., Disp: , Rfl:         Objective:       Physical Examination:     BP (!) 118/56   Pulse (!) 57   Temp 97.6 °F (36.4 °C)   Resp 17   Wt 64.9 kg (143 lb)   LMP 11/13/2015   BMI 26.16 kg/m²     Physical Exam  Constitutional:       Appearance: Normal appearance. She is well-developed.   HENT:      Head: Normocephalic and atraumatic.      Right Ear: External ear normal.      Left Ear: External ear normal.   Eyes:      General: No scleral icterus.     Conjunctiva/sclera: Conjunctivae normal.   Neck:      Thyroid: No thyromegaly.      Trachea: No tracheal deviation.   Cardiovascular:      Rate and Rhythm: Normal rate.   Pulmonary:      Effort: Pulmonary effort is normal.   Chest:       Abdominal:      General: Abdomen is flat.   Neurological:      General: No focal deficit present.      Mental Status: She is alert and oriented to person, place, and time.      Comments: Neuro intact througout   Psychiatric:         Behavior: Behavior normal.          Thought Content: Thought content normal.         Judgment: Judgment normal.         Labs:   No results found for this or any previous visit (from the past 2 weeks).  CMP  Sodium   Date Value Ref Range Status   05/31/2024 142 136 - 145 mmol/L Final     Potassium   Date Value Ref Range Status   05/31/2024 4.1 3.5 - 5.1 mmol/L Final     Chloride   Date Value Ref Range Status   05/31/2024 109 95 - 110 mmol/L Final     CO2   Date Value Ref Range Status   05/31/2024 26 23 - 29 mmol/L Final     Glucose   Date Value Ref Range Status   05/31/2024 87 70 - 110 mg/dL Final     BUN   Date Value Ref Range Status   05/31/2024 14 6 - 20 mg/dL Final     Creatinine   Date Value Ref Range Status   05/31/2024 0.6 0.5 - 1.4 mg/dL Final     Calcium   Date Value Ref Range Status   05/31/2024 9.2 8.7 - 10.5 mg/dL Final     Total Protein   Date Value Ref Range Status   05/31/2024 7.0 6.0 - 8.4 g/dL Final     Albumin   Date Value Ref Range Status   05/31/2024 4.3 3.5 - 5.2 g/dL Final     Total Bilirubin   Date Value Ref Range Status   05/31/2024 0.5 0.1 - 1.0 mg/dL Final     Comment:     For infants and newborns, interpretation of results should be based  on gestational age, weight and in agreement with clinical  observations.    Premature Infant recommended reference ranges:  Up to 24 hours.............<8.0 mg/dL  Up to 48 hours............<12.0 mg/dL  3-5 days..................<15.0 mg/dL  6-29 days.................<15.0 mg/dL       Alkaline Phosphatase   Date Value Ref Range Status   05/31/2024 78 55 - 135 U/L Final     AST   Date Value Ref Range Status   05/31/2024 16 10 - 40 U/L Final     ALT   Date Value Ref Range Status   05/31/2024 14 10 - 44 U/L Final     Anion Gap   Date Value Ref Range Status   05/31/2024 7 (L) 8 - 16 mmol/L Final     eGFR if    Date Value Ref Range Status   01/07/2022 >60.0 >60 mL/min/1.73 m^2 Final     eGFR if non    Date Value Ref Range Status   01/07/2022 >60.0 >60 mL/min/1.73  "m^2 Final     Comment:     Calculation used to obtain the estimated glomerular filtration  rate (eGFR) is the CKD-EPI equation.        Lab Results   Component Value Date    CEA 3.5 01/03/2024     No results found for: "PSA"        Assessment/Plan:     Problem List Items Addressed This Visit       Malignant neoplasm of upper-outer quadrant of left breast in female, estrogen receptor negative - Primary (Chronic)     Patient is doing well and appears INDIO at this time.  Exam is negative and she has no other new concerning signs or symptoms of disease.  Will continue to see her on a six month follow up schedule.               Discussion:     Follow up in about 6 months (around 4/21/2025).      Electronically signed by Chase Colon                "

## 2024-12-09 ENCOUNTER — LAB VISIT (OUTPATIENT)
Dept: LAB | Facility: HOSPITAL | Age: 59
End: 2024-12-09
Attending: INTERNAL MEDICINE
Payer: COMMERCIAL

## 2024-12-09 DIAGNOSIS — R10.11 ABDOMINAL PAIN, RIGHT UPPER QUADRANT: Primary | ICD-10-CM

## 2024-12-09 DIAGNOSIS — E78.2 MIXED HYPERLIPIDEMIA: ICD-10-CM

## 2024-12-09 LAB
ALBUMIN SERPL BCP-MCNC: 4.6 G/DL (ref 3.5–5.2)
ALP SERPL-CCNC: 78 U/L (ref 55–135)
ALT SERPL W/O P-5'-P-CCNC: 18 U/L (ref 10–44)
ANION GAP SERPL CALC-SCNC: 6 MMOL/L (ref 8–16)
AST SERPL-CCNC: 18 U/L (ref 10–40)
BILIRUB SERPL-MCNC: 0.6 MG/DL (ref 0.1–1)
BUN SERPL-MCNC: 15 MG/DL (ref 6–20)
CALCIUM SERPL-MCNC: 9.2 MG/DL (ref 8.7–10.5)
CHLORIDE SERPL-SCNC: 107 MMOL/L (ref 95–110)
CHOLEST SERPL-MCNC: 216 MG/DL (ref 120–199)
CHOLEST/HDLC SERPL: 4.6 {RATIO} (ref 2–5)
CO2 SERPL-SCNC: 29 MMOL/L (ref 23–29)
CREAT SERPL-MCNC: 0.7 MG/DL (ref 0.5–1.4)
EST. GFR  (NO RACE VARIABLE): >60 ML/MIN/1.73 M^2
GLUCOSE SERPL-MCNC: 90 MG/DL (ref 70–110)
HDLC SERPL-MCNC: 47 MG/DL (ref 40–75)
HDLC SERPL: 21.8 % (ref 20–50)
LDLC SERPL CALC-MCNC: 139.4 MG/DL (ref 63–159)
NONHDLC SERPL-MCNC: 169 MG/DL
POTASSIUM SERPL-SCNC: 4.3 MMOL/L (ref 3.5–5.1)
PROT SERPL-MCNC: 7.4 G/DL (ref 6–8.4)
SODIUM SERPL-SCNC: 142 MMOL/L (ref 136–145)
TRIGL SERPL-MCNC: 148 MG/DL (ref 30–150)

## 2024-12-09 PROCEDURE — 36415 COLL VENOUS BLD VENIPUNCTURE: CPT | Performed by: INTERNAL MEDICINE

## 2024-12-09 PROCEDURE — 80061 LIPID PANEL: CPT | Performed by: INTERNAL MEDICINE

## 2024-12-09 PROCEDURE — 80053 COMPREHEN METABOLIC PANEL: CPT | Performed by: INTERNAL MEDICINE

## 2025-02-20 ENCOUNTER — HOSPITAL ENCOUNTER (EMERGENCY)
Facility: HOSPITAL | Age: 60
Discharge: HOME | End: 2025-02-20
Attending: STUDENT IN AN ORGANIZED HEALTH CARE EDUCATION/TRAINING PROGRAM
Payer: COMMERCIAL

## 2025-02-20 VITALS
WEIGHT: 140 LBS | HEART RATE: 62 BPM | BODY MASS INDEX: 25.76 KG/M2 | HEIGHT: 62 IN | SYSTOLIC BLOOD PRESSURE: 126 MMHG | RESPIRATION RATE: 16 BRPM | TEMPERATURE: 99 F | OXYGEN SATURATION: 99 % | DIASTOLIC BLOOD PRESSURE: 76 MMHG

## 2025-02-20 DIAGNOSIS — N30.01 ACUTE CYSTITIS WITH HEMATURIA: Primary | ICD-10-CM

## 2025-02-20 LAB
BACTERIA #/AREA URNS HPF: ABNORMAL /HPF
BILIRUB UR QL STRIP: NEGATIVE
CLARITY UR: ABNORMAL
COLOR UR: ABNORMAL
GLUCOSE UR QL STRIP: NEGATIVE
HGB UR QL STRIP: ABNORMAL
HYALINE CASTS #/AREA URNS LPF: 10 /LPF
KETONES UR QL STRIP: NEGATIVE
LEUKOCYTE ESTERASE UR QL STRIP: ABNORMAL
MICROSCOPIC COMMENT: ABNORMAL
NITRITE UR QL STRIP: NEGATIVE
PH UR STRIP: 6 [PH] (ref 5–8)
PROT UR QL STRIP: ABNORMAL
RBC #/AREA URNS HPF: >100 /HPF (ref 0–4)
SP GR UR STRIP: 1.02 (ref 1–1.03)
URN SPEC COLLECT METH UR: ABNORMAL
UROBILINOGEN UR STRIP-ACNC: NEGATIVE EU/DL
WBC #/AREA URNS HPF: >100 /HPF (ref 0–5)

## 2025-02-20 PROCEDURE — 99283 EMERGENCY DEPT VISIT LOW MDM: CPT

## 2025-02-20 PROCEDURE — 25000003 PHARM REV CODE 250: Performed by: STUDENT IN AN ORGANIZED HEALTH CARE EDUCATION/TRAINING PROGRAM

## 2025-02-20 PROCEDURE — 87186 SC STD MICRODIL/AGAR DIL: CPT | Performed by: STUDENT IN AN ORGANIZED HEALTH CARE EDUCATION/TRAINING PROGRAM

## 2025-02-20 PROCEDURE — 87086 URINE CULTURE/COLONY COUNT: CPT | Performed by: STUDENT IN AN ORGANIZED HEALTH CARE EDUCATION/TRAINING PROGRAM

## 2025-02-20 PROCEDURE — 81003 URINALYSIS AUTO W/O SCOPE: CPT | Performed by: STUDENT IN AN ORGANIZED HEALTH CARE EDUCATION/TRAINING PROGRAM

## 2025-02-20 PROCEDURE — 81001 URINALYSIS AUTO W/SCOPE: CPT | Performed by: STUDENT IN AN ORGANIZED HEALTH CARE EDUCATION/TRAINING PROGRAM

## 2025-02-20 RX ORDER — NITROFURANTOIN 25; 75 MG/1; MG/1
100 CAPSULE ORAL 2 TIMES DAILY
Qty: 10 CAPSULE | Refills: 0 | Status: SHIPPED | OUTPATIENT
Start: 2025-02-20 | End: 2025-02-25

## 2025-02-20 RX ORDER — NITROFURANTOIN 25; 75 MG/1; MG/1
100 CAPSULE ORAL
Status: COMPLETED | OUTPATIENT
Start: 2025-02-20 | End: 2025-02-20

## 2025-02-20 RX ADMIN — NITROFURANTOIN MONOHYDRATE/MACROCRYSTALS 100 MG: 25; 75 CAPSULE ORAL at 05:02

## 2025-02-20 NOTE — ED PROVIDER NOTES
Encounter Date: 2/20/2025       History     Chief Complaint   Patient presents with    Hematuria    Dysuria     Patient c/o hematuria, dysuria x 2 days     HPI    Jordan Lion is a 59 y.o. female without significant medical problems that presents emergency department for dysuria, hematuria, and urinary urgency has been ongoing for 2 days.  Denies abdominal pain, nausea, vomiting, fevers, clots, or vaginal discharge. Taken Tylenol and ibuprofen without improvement of symptoms.    Review of patient's allergies indicates:  No Known Allergies  Past Medical History:   Diagnosis Date    Breast cancer     Cancer     breast left     PONV (postoperative nausea and vomiting)      Past Surgical History:   Procedure Laterality Date    BREAST SURGERY      MASTECTOMY Bilateral     lymph nodes from left arm with tissue expander     No family history on file.  Social History[1]  Review of Systems   Constitutional:  Negative for fever.   HENT:  Negative for sore throat.    Respiratory:  Negative for shortness of breath.    Cardiovascular:  Negative for chest pain.   Gastrointestinal:  Negative for abdominal pain, diarrhea, nausea and vomiting.   Genitourinary:  Positive for dysuria, frequency, hematuria and urgency. Negative for flank pain.   Musculoskeletal:  Negative for back pain.   Skin:  Negative for rash.   Neurological:  Negative for weakness.   Hematological:  Does not bruise/bleed easily.       Physical Exam     Initial Vitals   BP Pulse Resp Temp SpO2   02/20/25 0359 02/20/25 0359 02/20/25 0359 02/20/25 0359 02/20/25 0359   133/80 (!) 59 18 98.8 °F (37.1 °C) 97 %      MAP       --                Physical Exam    Nursing note and vitals reviewed.  Constitutional: She appears well-developed and well-nourished.   HENT:   Head: Normocephalic and atraumatic.   Eyes: EOM are normal. Pupils are equal, round, and reactive to light.   Neck:   Normal range of motion.  Cardiovascular:  Normal rate, regular rhythm and normal heart sounds.            Pulmonary/Chest: Breath sounds normal. No respiratory distress. She has no wheezes. She has no rhonchi. She has no rales.   Abdominal: Abdomen is soft. She exhibits no distension. There is no abdominal tenderness (No CVA tenderness). There is no rebound.   Musculoskeletal:         General: Normal range of motion.      Cervical back: Normal range of motion.     Neurological: She is alert and oriented to person, place, and time. She has normal strength. No cranial nerve deficit or sensory deficit. GCS score is 15. GCS eye subscore is 4. GCS verbal subscore is 5. GCS motor subscore is 6.   Skin: Capillary refill takes less than 2 seconds. No rash noted.   Psychiatric: She has a normal mood and affect. Thought content normal.         ED Course   Procedures  Labs Reviewed   URINALYSIS, REFLEX TO URINE CULTURE - Abnormal       Result Value    Specimen UA Urine, Clean Catch      Color, UA Red (*)     Appearance, UA Cloudy (*)     pH, UA 6.0      Specific Gravity, UA 1.020      Protein, UA 2+ (*)     Glucose, UA Negative      Ketones, UA Negative      Bilirubin (UA) Negative      Occult Blood UA 3+ (*)     Nitrite, UA Negative      Urobilinogen, UA Negative      Leukocytes, UA 3+ (*)     Narrative:     Specimen Source->Urine   URINALYSIS MICROSCOPIC - Abnormal    RBC, UA >100 (*)     WBC, UA >100 (*)     Bacteria Few (*)     Hyaline Casts, UA 10 (*)     Microscopic Comment SEE COMMENT      Narrative:     Specimen Source->Urine   CULTURE, URINE          Imaging Results    None          Medications   nitrofurantoin (macrocrystal-monohydrate) 100 MG capsule 100 mg (100 mg Oral Given 2/20/25 0517)     Medical Decision Making  59-year-old female presenting for UTI like symptoms.  Vitals were stable.  Abdominal exam benign.  No CVA tenderness.  UA consistent with UTI.  Considered kidney stone, but patient was not having any flank or abdominal pain.  No evidence of pyelonephritis given exam and vital signs.  Given 1st  dose of Macrobid here and discharging home with Macrobid.  Urine culture is pending.  Instructed to follow up with primary care.    Risk  Prescription drug management.                                      Clinical Impression:  Final diagnoses:  [N30.01] Acute cystitis with hematuria (Primary)          ED Disposition Condition    Discharge Stable          ED Prescriptions       Medication Sig Dispense Start Date End Date Auth. Provider    nitrofurantoin, macrocrystal-monohydrate, (MACROBID) 100 MG capsule Take 1 capsule (100 mg total) by mouth 2 (two) times daily. for 5 days 10 capsule 2/20/2025 2/25/2025 Morgan Hopson MD          Follow-up Information       Follow up With Specialties Details Why Contact Info Additional Information    [facility] - Emergency Dept Emergency Medicine  As needed, If symptoms worsen [address] [facility] [id number] [phone] 1st floor    Brayan Devlin MD Internal Medicine Call in 2 days  [address] [facility]  [address]  [facility] [address]  [phone]                  [1]   Social History  Tobacco Use    Smoking status: Never    Smokeless tobacco: Never   Substance Use Topics    Alcohol use: No     Alcohol/week: 0.0 standard drinks of alcohol    Drug use: No        Morgan Hopson MD  02/20/25 3414

## 2025-02-20 NOTE — DISCHARGE INSTRUCTIONS
Please return to the ED if you have new or worsening symptoms. Follow-up with PCP for further evaluation.  Begin taking Macrobid twice daily for UTI.

## 2025-02-21 ENCOUNTER — RESULTS FOLLOW-UP (OUTPATIENT)
Dept: EMERGENCY MEDICINE | Facility: HOSPITAL | Age: 60
End: 2025-02-21

## 2025-02-22 LAB — BACTERIA UR CULT: ABNORMAL

## 2025-02-27 ENCOUNTER — HOSPITAL ENCOUNTER (EMERGENCY)
Facility: HOSPITAL | Age: 60
Discharge: HOME | End: 2025-02-28
Attending: STUDENT IN AN ORGANIZED HEALTH CARE EDUCATION/TRAINING PROGRAM
Payer: COMMERCIAL

## 2025-02-27 DIAGNOSIS — N30.01 ACUTE CYSTITIS WITH HEMATURIA: Primary | ICD-10-CM

## 2025-02-27 PROCEDURE — 87086 URINE CULTURE/COLONY COUNT: CPT | Performed by: STUDENT IN AN ORGANIZED HEALTH CARE EDUCATION/TRAINING PROGRAM

## 2025-02-27 PROCEDURE — 99284 EMERGENCY DEPT VISIT MOD MDM: CPT

## 2025-02-27 PROCEDURE — 87186 SC STD MICRODIL/AGAR DIL: CPT | Performed by: STUDENT IN AN ORGANIZED HEALTH CARE EDUCATION/TRAINING PROGRAM

## 2025-02-27 PROCEDURE — 81001 URINALYSIS AUTO W/SCOPE: CPT | Performed by: STUDENT IN AN ORGANIZED HEALTH CARE EDUCATION/TRAINING PROGRAM

## 2025-02-28 VITALS
OXYGEN SATURATION: 98 % | WEIGHT: 140 LBS | DIASTOLIC BLOOD PRESSURE: 60 MMHG | TEMPERATURE: 99 F | HEIGHT: 62 IN | HEART RATE: 58 BPM | RESPIRATION RATE: 16 BRPM | SYSTOLIC BLOOD PRESSURE: 118 MMHG | BODY MASS INDEX: 25.76 KG/M2

## 2025-02-28 LAB
ALBUMIN SERPL BCP-MCNC: 4.6 G/DL (ref 3.5–5.2)
ALP SERPL-CCNC: 81 U/L (ref 55–135)
ALT SERPL W/O P-5'-P-CCNC: 14 U/L (ref 10–44)
ANION GAP SERPL CALC-SCNC: 7 MMOL/L (ref 8–16)
AST SERPL-CCNC: 14 U/L (ref 10–40)
BACTERIA #/AREA URNS HPF: ABNORMAL /HPF
BASOPHILS # BLD AUTO: 0.03 K/UL (ref 0–0.2)
BASOPHILS NFR BLD: 0.3 % (ref 0–1.9)
BILIRUB SERPL-MCNC: 0.3 MG/DL (ref 0.1–1)
BILIRUB UR QL STRIP: NEGATIVE
BUN SERPL-MCNC: 12 MG/DL (ref 6–20)
CALCIUM SERPL-MCNC: 9.4 MG/DL (ref 8.7–10.5)
CHLORIDE SERPL-SCNC: 108 MMOL/L (ref 95–110)
CLARITY UR: CLEAR
CO2 SERPL-SCNC: 26 MMOL/L (ref 23–29)
COLOR UR: YELLOW
CREAT SERPL-MCNC: 0.6 MG/DL (ref 0.5–1.4)
DIFFERENTIAL METHOD BLD: ABNORMAL
EOSINOPHIL # BLD AUTO: 0.1 K/UL (ref 0–0.5)
EOSINOPHIL NFR BLD: 0.8 % (ref 0–8)
ERYTHROCYTE [DISTWIDTH] IN BLOOD BY AUTOMATED COUNT: 12.8 % (ref 11.5–14.5)
EST. GFR  (NO RACE VARIABLE): >60 ML/MIN/1.73 M^2
GLUCOSE SERPL-MCNC: 86 MG/DL (ref 70–110)
GLUCOSE UR QL STRIP: NEGATIVE
HCT VFR BLD AUTO: 39.6 % (ref 37–48.5)
HGB BLD-MCNC: 13.1 G/DL (ref 12–16)
HGB UR QL STRIP: ABNORMAL
IMM GRANULOCYTES # BLD AUTO: 0.04 K/UL (ref 0–0.04)
IMM GRANULOCYTES NFR BLD AUTO: 0.4 % (ref 0–0.5)
KETONES UR QL STRIP: NEGATIVE
LEUKOCYTE ESTERASE UR QL STRIP: ABNORMAL
LIPASE SERPL-CCNC: 23 U/L (ref 4–60)
LYMPHOCYTES # BLD AUTO: 3.6 K/UL (ref 1–4.8)
LYMPHOCYTES NFR BLD: 32.7 % (ref 18–48)
MCH RBC QN AUTO: 31.4 PG (ref 27–31)
MCHC RBC AUTO-ENTMCNC: 33.1 G/DL (ref 32–36)
MCV RBC AUTO: 95 FL (ref 82–98)
MICROSCOPIC COMMENT: ABNORMAL
MONOCYTES # BLD AUTO: 0.7 K/UL (ref 0.3–1)
MONOCYTES NFR BLD: 6.3 % (ref 4–15)
NEUTROPHILS # BLD AUTO: 6.5 K/UL (ref 1.8–7.7)
NEUTROPHILS NFR BLD: 59.5 % (ref 38–73)
NITRITE UR QL STRIP: POSITIVE
NRBC BLD-RTO: 0 /100 WBC
PH UR STRIP: 7 [PH] (ref 5–8)
PLATELET # BLD AUTO: 338 K/UL (ref 150–450)
PMV BLD AUTO: 9.2 FL (ref 9.2–12.9)
POTASSIUM SERPL-SCNC: 3.9 MMOL/L (ref 3.5–5.1)
PROT SERPL-MCNC: 7.5 G/DL (ref 6–8.4)
PROT UR QL STRIP: NEGATIVE
RBC # BLD AUTO: 4.17 M/UL (ref 4–5.4)
RBC #/AREA URNS HPF: 6 /HPF (ref 0–4)
SODIUM SERPL-SCNC: 141 MMOL/L (ref 136–145)
SP GR UR STRIP: 1 (ref 1–1.03)
URN SPEC COLLECT METH UR: ABNORMAL
UROBILINOGEN UR STRIP-ACNC: NEGATIVE EU/DL
WBC # BLD AUTO: 10.93 K/UL (ref 3.9–12.7)
WBC #/AREA URNS HPF: 40 /HPF (ref 0–5)

## 2025-02-28 PROCEDURE — 80053 COMPREHEN METABOLIC PANEL: CPT | Performed by: STUDENT IN AN ORGANIZED HEALTH CARE EDUCATION/TRAINING PROGRAM

## 2025-02-28 PROCEDURE — 85025 COMPLETE CBC W/AUTO DIFF WBC: CPT | Performed by: STUDENT IN AN ORGANIZED HEALTH CARE EDUCATION/TRAINING PROGRAM

## 2025-02-28 PROCEDURE — 25000003 PHARM REV CODE 250: Performed by: STUDENT IN AN ORGANIZED HEALTH CARE EDUCATION/TRAINING PROGRAM

## 2025-02-28 PROCEDURE — 83690 ASSAY OF LIPASE: CPT | Performed by: STUDENT IN AN ORGANIZED HEALTH CARE EDUCATION/TRAINING PROGRAM

## 2025-02-28 RX ORDER — CEPHALEXIN 500 MG/1
500 CAPSULE ORAL 4 TIMES DAILY
Qty: 28 CAPSULE | Refills: 0 | Status: SHIPPED | OUTPATIENT
Start: 2025-02-28 | End: 2025-03-07

## 2025-02-28 RX ORDER — CEPHALEXIN 250 MG/1
500 CAPSULE ORAL
Status: COMPLETED | OUTPATIENT
Start: 2025-02-28 | End: 2025-02-28

## 2025-02-28 RX ADMIN — CEPHALEXIN 500 MG: 250 CAPSULE ORAL at 01:02

## 2025-02-28 NOTE — ED PROVIDER NOTES
Encounter Date: 2/27/2025       History     Chief Complaint   Patient presents with    Hematuria     HPI    59-year-old woman presents for recurrent dysuria and hematuria.  She reports she had this issue about a week ago.  She was treated with a 5 day course of antibiotics.  The symptoms resolved.  They recurred today dysuria and hematuria.  She denies fever chills cough congestion belly pain, flank pain, nausea or vomiting change in bowel habits.  She has a history of breast cancer remote she is not on treatment.  Review of patient's allergies indicates:  No Known Allergies  Past Medical History:   Diagnosis Date    Breast cancer     Cancer     breast left     PONV (postoperative nausea and vomiting)      Past Surgical History:   Procedure Laterality Date    BREAST SURGERY      MASTECTOMY Bilateral     lymph nodes from left arm with tissue expander     No family history on file.  Social History[1]  Review of Systems   All other systems reviewed and are negative.      Physical Exam     Initial Vitals   BP Pulse Resp Temp SpO2   02/27/25 2253 02/27/25 2253 02/27/25 2250 02/27/25 2253 02/27/25 2253   116/65 (!) 55 16 98.7 °F (37.1 °C) 99 %      MAP       --                Physical Exam    Nursing note and vitals reviewed.  Constitutional: She appears well-developed. She is not diaphoretic.   HENT:   Head: Normocephalic and atraumatic.   Eyes: Right eye exhibits no discharge. Left eye exhibits no discharge.   Neck: No tracheal deviation present.   Cardiovascular:  Normal rate, regular rhythm and intact distal pulses.           Pulmonary/Chest: Breath sounds normal. No stridor. No respiratory distress.   Abdominal: Abdomen is soft. There is no abdominal tenderness.   No CVA tenderness   Musculoskeletal:         General: No tenderness.     Neurological: She is alert and oriented to person, place, and time.   Skin: Skin is warm and dry.         ED Course   Procedures  Labs Reviewed   CBC W/ AUTO DIFFERENTIAL - Abnormal        Result Value    WBC 10.93      RBC 4.17      Hemoglobin 13.1      Hematocrit 39.6      MCV 95      MCH 31.4 (*)     MCHC 33.1      RDW 12.8      Platelets 338      MPV 9.2      Immature Granulocytes 0.4      Gran # (ANC) 6.5      Immature Grans (Abs) 0.04      Lymph # 3.6      Mono # 0.7      Eos # 0.1      Baso # 0.03      nRBC 0      Gran % 59.5      Lymph % 32.7      Mono % 6.3      Eosinophil % 0.8      Basophil % 0.3      Differential Method Automated     COMPREHENSIVE METABOLIC PANEL - Abnormal    Sodium 141      Potassium 3.9      Chloride 108      CO2 26      Glucose 86      BUN 12      Creatinine 0.6      Calcium 9.4      Total Protein 7.5      Albumin 4.6      Total Bilirubin 0.3      Alkaline Phosphatase 81      AST 14      ALT 14      eGFR >60.0      Anion Gap 7 (*)    URINALYSIS, REFLEX TO URINE CULTURE - Abnormal    Specimen UA Urine, Clean Catch      Color, UA Yellow      Appearance, UA Clear      pH, UA 7.0      Specific Gravity, UA 1.005      Protein, UA Negative      Glucose, UA Negative      Ketones, UA Negative      Bilirubin (UA) Negative      Occult Blood UA 3+ (*)     Nitrite, UA Positive (*)     Urobilinogen, UA Negative      Leukocytes, UA 3+ (*)     Narrative:     Specimen Source->Urine   URINALYSIS MICROSCOPIC - Abnormal    RBC, UA 6 (*)     WBC, UA 40 (*)     Bacteria Occasional      Microscopic Comment SEE COMMENT      Narrative:     Specimen Source->Urine   CULTURE, URINE   LIPASE    Lipase 23            Imaging Results              CT Renal Stone Study ABD Pelvis WO (Final result)  Result time 02/28/25 01:10:39      Final result by Ashley Lester MD (02/28/25 01:10:39)                   Impression:      1. Circumferential bladder wall thickening with perivesicular inflammatory change which can be seen with infectious process/cystitis.  Correlation with urinalysis advised.  2. No evidence of hydronephrosis/obstructive uropathy or nephrolithiasis.  3. Indeterminate left hepatic  hypodensity measuring 1.2 cm.  Further assessment with non-emergent, outpatient MRI abdomen or triple phase liver CT is advised for more definitive assessm evaluation.  4. Moderate to large volume of fecal material in the colon.      Electronically signed by: Ashley Lester MD  Date:    02/28/2025  Time:    01:10               Narrative:    EXAMINATION:  CT RENAL STONE STUDY ABD PELVIS WO    CLINICAL HISTORY:  Nephrolithiasis, symptomatic/complicated;    TECHNIQUE:  Low dose axial images, sagittal and coronal reformations were obtained from the lung bases to the pubic symphysis.  Contrast was not administered.    COMPARISON:  None    FINDINGS:  The visualized lung bases are free of pleural fluid or focal consolidation.  The visualized portions of the heart and pericardium are within normal limits.  Bilateral breast present.    Please note evaluation of solid organ parenchyma is limited due to lack of IV contrast.  The liver demonstrates an indeterminate left hepatic hypodensity measuring 1.2 cm.  The spleen, liver and adrenal glands demonstrate an unremarkable noncontrast CT appearance.  No calcified stones in the gallbladder lumen.    The kidneys are normal in size head location.  No evidence of hydroureteronephrosis or nephroureterolithiasis.  The urinary bladder is distended with wall thickening and perivesicular inflammatory change.  Findings can be seen with infectious process/cystitis.  Correlation with urinalysis advised.  The uterus demonstrates a nodular contour suggestive of fibroids.    The abdominal aorta is nonaneurysmal shotty periaortic lymph nodes.  No retroperitoneal fluid/hemorrhage.    The visualized loops of large and small bowel demonstrate no evidence of obstruction or inflammatory change.  There is a moderate to large volume of fecal material in the colon.  The appendix is unremarkable.  No free intraperitoneal air, portal venous gas or ascites.    Visualized osseous structures are intact.   Degenerative change.  Extraperitoneal soft tissues are within normal limits.                                       Medications   cephALEXin capsule 500 mg (has no administration in time range)     Medical Decision Making  59-year-old woman presents for dysuria and hematuria, no other complaints, vitals are within normal limits, on exam she is very well-appearing soft abdomen no CVA tenderness.  Differential includes UTI stone cancer interstitial cystitis.  I will get a CT renal stone to ensure she does not have a stone, we will get labs and a UA.  If she has a recurrent UTI I think she is reasonable for change in antibiotics and discharge.  No clinical evidence of pyelo.    No stone, looks like she has a UTI, change her to Keflex, referred to Urology, given strict return precautions follow up instructions repeat exam is reassuring    Amount and/or Complexity of Data Reviewed  External Data Reviewed: labs.     Details: Micro from prior visit he coli, resistant to ampicillin and Unasyn  Labs: ordered.  Radiology: ordered and independent interpretation performed. Decision-making details documented in ED Course.    Risk  Prescription drug management.               ED Course as of 02/28/25 0144   Fri Feb 28, 2025   0106 I do not appreciate evidence of radiopaque nephrolithiasis on my independent interpretation of CT [IC]   0141 Updated on findings including liver lesion, she needs out patient follow up for this, Return precautions/follow up instructions/treatment plan given, expressed agreement and understanding.    [IC]      ED Course User Index  [IC] Anjel Wu MD                           Clinical Impression:  Final diagnoses:  [N30.01] Acute cystitis with hematuria (Primary)          ED Disposition Condition    Discharge Stable          ED Prescriptions       Medication Sig Dispense Start Date End Date Auth. Provider    cephALEXin (KEFLEX) 500 MG capsule Take 1 capsule (500 mg total) by mouth 4 (four)  times daily. for 7 days 28 capsule 2/28/2025 3/7/2025 Anjel Wu MD          Follow-up Information       Follow up With Specialties Details Why Contact Info Additional Information    Brayan Devlin MD Internal Medicine Schedule an appointment as soon as possible for a visit on 3/3/2025  [address] [facility]  [address]  [facility] [address]  [phone]       [facility] - Emergency Dept Emergency Medicine Go to  As needed, If symptoms worsen [address] [facility] [id number] [phone] 1st floor    Daniele Yun MD Urology Schedule an appointment as soon as possible for a visit in 1 week  [address] [place] [address]  [address]  [facility] [address]  [phone]                  [1]   Social History  Tobacco Use    Smoking status: Never    Smokeless tobacco: Never   Substance Use Topics    Alcohol use: No     Alcohol/week: 0.0 standard drinks of alcohol    Drug use: No        Anjel Wu MD  02/28/25 0144

## 2025-02-28 NOTE — DISCHARGE INSTRUCTIONS
"Your CT scan did not show a stone.  Take your antibiotics as directed.  Follow up with the urologist.  Your CT scan did show an area in your liver that needs follow up. "Indeterminate left hepatic hypodensity measuring 1.2 cm. Further assessment with non-emergent, outpatient MRI abdomen or triple phase liver CT is advised for more definitive assessm evaluation."      Please return to this facility or another ED as needed for any new or worsening symptoms including chest pain, shortness of breath, abdominal pain, nausea or vomiting, fever or chills. Please follow up with your primary care doctor in 3 days. Please take all medicines as prescribed.     "

## 2025-03-02 ENCOUNTER — RESULTS FOLLOW-UP (OUTPATIENT)
Dept: EMERGENCY MEDICINE | Facility: HOSPITAL | Age: 60
End: 2025-03-02

## 2025-03-02 LAB — BACTERIA UR CULT: ABNORMAL

## 2025-03-06 DIAGNOSIS — D37.6 NEOPLASM OF UNCERTAIN BEHAVIOR OF LIVER AND BILIARY PASSAGE: Primary | ICD-10-CM

## 2025-03-27 ENCOUNTER — TELEPHONE (OUTPATIENT)
Dept: UROLOGY | Facility: CLINIC | Age: 60
End: 2025-03-27
Payer: COMMERCIAL

## 2025-03-27 NOTE — TELEPHONE ENCOUNTER
Spoke with patient informed her provider out of the office this morning due to emergency surgery and will need to reschedule 8:30am appointment. Offered appointment today at 9am with terry ann. Patient declined and opted to reschedule at next available on 4/22 with .

## 2025-03-28 ENCOUNTER — HOSPITAL ENCOUNTER (OUTPATIENT)
Dept: RADIOLOGY | Facility: HOSPITAL | Age: 60
Discharge: HOME | End: 2025-03-28
Attending: INTERNAL MEDICINE
Payer: COMMERCIAL

## 2025-03-28 DIAGNOSIS — D37.6 NEOPLASM OF UNCERTAIN BEHAVIOR OF LIVER AND BILIARY PASSAGE: ICD-10-CM

## 2025-03-28 PROCEDURE — 74183 MRI ABD W/O CNTR FLWD CNTR: CPT | Mod: TC

## 2025-03-28 PROCEDURE — 74183 MRI ABD W/O CNTR FLWD CNTR: CPT | Mod: 26,,, | Performed by: RADIOLOGY

## 2025-03-28 PROCEDURE — 25500020 PHARM REV CODE 255: Performed by: INTERNAL MEDICINE

## 2025-03-28 PROCEDURE — A9585 GADOBUTROL INJECTION: HCPCS | Performed by: INTERNAL MEDICINE

## 2025-03-28 RX ORDER — GADOBUTROL 604.72 MG/ML
7 INJECTION INTRAVENOUS
Status: COMPLETED | OUTPATIENT
Start: 2025-03-28 | End: 2025-03-28

## 2025-03-28 RX ADMIN — GADOBUTROL 7 ML: 604.72 INJECTION INTRAVENOUS at 02:03

## 2025-04-10 ENCOUNTER — OFFICE VISIT (OUTPATIENT)
Dept: UROLOGY | Facility: CLINIC | Age: 60
End: 2025-04-10
Payer: COMMERCIAL

## 2025-04-10 ENCOUNTER — RESULTS FOLLOW-UP (OUTPATIENT)
Dept: UROLOGY | Facility: CLINIC | Age: 60
End: 2025-04-10

## 2025-04-10 ENCOUNTER — PATIENT MESSAGE (OUTPATIENT)
Dept: UROLOGY | Facility: CLINIC | Age: 60
End: 2025-04-10

## 2025-04-10 VITALS
SYSTOLIC BLOOD PRESSURE: 124 MMHG | WEIGHT: 140 LBS | DIASTOLIC BLOOD PRESSURE: 77 MMHG | HEIGHT: 62 IN | HEART RATE: 57 BPM | BODY MASS INDEX: 25.76 KG/M2

## 2025-04-10 DIAGNOSIS — R31.0 GROSS HEMATURIA: Primary | ICD-10-CM

## 2025-04-10 DIAGNOSIS — N39.3 SUI (STRESS URINARY INCONTINENCE, FEMALE): ICD-10-CM

## 2025-04-10 DIAGNOSIS — N30.01 ACUTE CYSTITIS WITH HEMATURIA: ICD-10-CM

## 2025-04-10 LAB
BILIRUBIN, UA POC OHS: NEGATIVE
BLOOD, UA POC OHS: ABNORMAL
CLARITY, UA POC OHS: CLEAR
COLOR, UA POC OHS: YELLOW
GLUCOSE, UA POC OHS: NEGATIVE
KETONES, UA POC OHS: NEGATIVE
LEUKOCYTES, UA POC OHS: NEGATIVE
MICROSCOPIC COMMENT: NORMAL
NITRITE, UA POC OHS: NEGATIVE
PH, UA POC OHS: 6
PROTEIN, UA POC OHS: NEGATIVE
RBC #/AREA URNS AUTO: <1 /HPF (ref 0–4)
SPECIFIC GRAVITY, UA POC OHS: <=1.005
SQUAMOUS #/AREA URNS AUTO: <1 /HPF
UROBILINOGEN, UA POC OHS: 0.2
WBC #/AREA URNS AUTO: <1 /HPF (ref 0–5)

## 2025-04-10 PROCEDURE — 99999 PR PBB SHADOW E&M-EST. PATIENT-LVL IV: CPT | Mod: PBBFAC,,, | Performed by: UROLOGY

## 2025-04-10 PROCEDURE — 81001 URINALYSIS AUTO W/SCOPE: CPT | Performed by: UROLOGY

## 2025-04-10 NOTE — PROGRESS NOTES
[facility], formerly known as [facility]  Group MD's:Alonzo/Santhosh/Court/Ellen  Group NP's: Alison Navarro    PCP: Brayan Devlin MD  Date of Service: 04/10/2025  Today's note written by: Liza Rosado md      Subjective:     HPI: Jordan Lion is a 59 y.o. female     Initial consult by me in clinic on 4/10/25 :  History of Present Illness    CHIEF COMPLAINT:  Ms. Lion presents for follow-up of recurrent UTIs with hematuria and to discuss further diagnostic workup.    HPI:  Ms. Lion reports two emergency room visits on February 20th and 27th for urinary tract infections. During the first episode on February 20th, she had severe hematuria, describing it as significant menstrual-like bleeding, along with urinary frequency. Ms. Lion was prescribed Macrobid (nitrofurantoin) for 5 days, which initially resolved her symptoms. Her symptoms returned within a few days, prompting a second ER visit on February 27th.    The second episode involved similar but less severe symptoms, including hematuria and dysuria. She was prescribed antibiotics for 7 days, taken 3-4 times daily. After completing this course, she contacted her primary care physician, Dr. Devlin, who provided additional antibiotics. Her symptoms resolved quickly with this treatment.    She underwent a CT on February 28th, which showed bladder wall thickening and a 1-2 cm liver lesion (later determined to be a hemangioma on MRI). She denies current hematuria but reports occasionally feeling unusual sensations that she has difficulty describing specifically. She clarifies it is not pain in her bladder.    She also reports occasional stress incontinence, particularly when coughing, laughing hard, or exercising. She urinates every 1-4 hours, depending on her fluid intake.    She denies fever or flank pain.    PERTINENT MEDICATIONS:  Macrobid (nitrofurantoin), 5 days course, for UTI (E. coli)  Antibiotics,  7 days course, 3-4 times daily, for UTI  Additional antibiotics prescribed by Dr. Devlin after the 7-day course    PERTINENT MEDICAL HISTORY:  Breast cancer  UTI: February 20th and 27th, 2023    PERTINENT FAMILY HISTORY:  Father: Lung cancer    PERTINENT TEST RESULTS:  Urinalysis: February 20, E. coli UTI detected  Urinalysis: February 27, E. coli infection detected  Urinalysis: Current visit, trace amount of blood detected CT Abdomen/Pelvis: February 28, showed thick bladder wall, no kidney stones, no hydronephrosis, 1-2 cm lesion in liver  MRI Abdomen: After February 28, showed liver lesion to be a hemangioma (collection of blood vessels)    SH:SUBSTANCE USE:  Smoking: Denies personal history of smoking       My notes:  Personal hx of breast cancer  Worked in farming - 8 years   C/o clarence - not that bad  No c/o oab    Urine history: family history of kidney, bladder or prostate cancer:No, personal or family history of kidney stones: No,tobacco use: No, anticoagulation: No  4/10/25 Tr bld  2/27/25 E.coli, void:3+bld/nit+/3+leuk, 6 rbc/40 wbc/occ bact  2/20/25 E.coli, void: red/3+bld/3+leuk,>100 rbc/>100 wbc/few bact      REVIEW OF SYSTEMS:  Negative except for as stated above    Past Medical History:   Diagnosis Date    Breast cancer     Cancer     breast left     PONV (postoperative nausea and vomiting)        Past Surgical History:   Procedure Laterality Date    BREAST SURGERY      MASTECTOMY Bilateral     lymph nodes from left arm with tissue expander            Objective:     Vitals:    04/10/25 0828   BP: 124/77   Pulse: (!) 57       Physical Exam          Assessment:     Jordan Lion is a 59 y.o. female with     1. Gross hematuria    2. Acute cystitis with hematuria    3. CLARENCE (stress urinary incontinence, female)        Plan:     's typed/written- Abbreviated/Short Plan:      Specific to this patient/Plan:  Ctu  Ua today with tr bld- ua amos, cytology (no uti sx)  Cystoscopy on Monday may 19- lab ua  reflex 10d prior  Discussed kegels and timed voiding for her CLARENCE. Not that bad      The workup for both microscopic and gross hematuria include a special imaging study called a CT urogram, a urine culture (if indicated - for example if there are white blood cells in the urine which tell us there is a possible infection) and a cystoscopy.   Your CT scan should be scheduled prior to leaving today. If none was scheduled please call the office to confirm none was needed. You will usually need to provide a blood test to check your kidney function before this test. If you are on metformin you will have to discontinue this for 48 hours after the ct scan.   Your cystoscopy is scheduled for Monday may 19 at the [facility] on a Monday afternoon. Please plan to return 1 week before your cystoscopy to provide a urine sample to confirm no infection. Please call the clinic if you do not have an appointment. The [facility] will call you on the Friday beforehand to tell you the time to be there. You do not have to fast (it is ok to eat).   I did send a urine cytology today. History of farming  I did not send a urine culture today to look for infection.     Definitions:  Hematuria (blood in urine)can be caused by stones, infection, tumors, large prostates, kidney disease and sometimes we just don't know.     Microscopic hematuria is when you cannot see the blood in your urine without a microscope. Sometimes this can be caused from infection or a stone. If we know this is the case, you may not need a workup.   First we will treat this and then do a further workup usually. Not all microscopic hematuria needs a workup unless you have >3 red blood cells per high power field or you have a significant history of smoking.    Gross hematuria is when you see bright red or pink urine (not brown necessarily- actually red or pink). Usually all gross hematuria episodes require a workup, even if it's likely caused  by a stone or infection.     The tests  The CT Urogram is a picture study done in radiology and is the gold standard for evaluating the kidneys and the tubes that drain them (ureters). It lets us look for kidney stones or tumors in the kidneys or uretger. If there is a filling defect were are concerned there may be a stone or a tumor. If you have poor kidney function we often times do a CT scan without contrast or kidney ultrasound but these are not as helpful. The CT scan does not show us the bladder that well which is why we also need to do a cystoscopy.     A Cystoscopy is where we take a little camera the size of a urethral catheter and look inside the bladder. This is done at the [facility] surgical center while you are awake and lasts less than 5 minutes. It is usually not very painful, only irritating. If we see something funny, we may biopsy it if you are not on any blood thinners.     Urine culture. If we see white blood cells in the urine, we will send your urine to grow on a petri dish to see if bacteria grow and what kind of antibiotics will kill it. Not all antibiotics work.     Urine cytology.  Sometimes we also send your urine to look under the microscope for cancer cells. You are at a much higher risk of having bladder cancer if you have a history of smoking.  It is not very specific. If they see cancer cells then we are very suspicious. If they do not see cancer cells that means we still need to make sure there are no cells.     _________________    Cystoscopy  Cystoscopy is a procedure that lets your doctor look directly inside your urethra and bladder. It can be used to:  Help diagnose a problem with your urethra, bladder, or kidneys.  Take a sample (biopsy) of bladder or urethral tissue.  Treat certain problems (such as removing kidney stones).  Place a stent to bypass an obstruction.  Take special X-rays of the kidneys.  Based on the findings, your doctor may recommend other tests or  treatments.  What is a cystoscope?  A cystoscope is a telescope-like instrument that contains lenses and fiberoptics (small glass wires that make bright light). The cystoscope may be straight and rigid, or flexible to bend around curves in the urethra. The doctor may look directly into the cystoscope, or project the image onto a monitor.  Getting ready  Ask your doctor if you should stop taking any medications prior to the procedure.   Ask whether you should avoid eating or drinking anything after midnight before the procedure.   Follow any other instructions your doctor gives you.  Tell your doctor before the exam if you:  Take any medications, such as aspirin or blood thinners  Have allergies to any medications  Are pregnant   The procedure  Cystoscopy is done in the doctors office or hospital. The doctor and a nurse are present during the procedure. It takes only a few minutes, longer if a biopsy, X-ray, or treatment needs to be done.  During the procedure:  You lie on an exam table on your back, knees bent and legs apart. You are covered with a drape.  Your urethra and the area around it are washed. Anesthetic jelly may be applied to numb the urethra. Other pain medication is usually not needed. In some cases, you may be offered a mild sedative to help you relax. If a more extensive procedure is to be done, such as a biopsy or kidney stone removal, general anesthesia may be needed.  The cystoscope is inserted. A sterile fluid is put into the bladder to expand it. You may feel pressure from this fluid.  When the procedure is done, the cystoscope is removed.  After the procedure  If you had a sedative, general anesthesia, or spinal anesthesia, you must have someone drive you home. Once youre home:  Drink plenty of fluids.  You may have burning or light bleeding when you urinate--this is normal.  Medications may be prescribed to ease any discomfort or prevent infection. Take these as directed.  Call your doctor  if you have heavy bleeding or blood clots, burning that lasts more than a day, a fever over 100°F  (38° C), or trouble urinating.  © 6380-5088 [employer]. [address] [place] [address], [place] [address]. All rights reserved. This information is not intended as a substitute for professional medical care. Always follow your healthcare professional's instructions.            The following assessment plan was created by [employer] via ambient listening:  Assessment & Plan    N30.01 Acute cystitis with hematuria  R31.0 Gross hematuria  N39.3 CLARENCE (stress urinary incontinence, female)    IMPRESSION:   Evaluated recurrent UTIs and gross hematuria.   CT from ER showed thickened bladder wall but no kidney stones or hydronephrosis.   1-2 cm hepatic lesion found incidentally, MRI showed benign hemangioma.   Current urinalysis shows trace blood, possibly from vaginal area.   Considered history of breast cancer in management approach.   Not initiating antibiotic treatment as current urinalysis does not indicate active infection.    Please review the short plan as above for concise and accurate plan. The dictated/AI generated plan may have some inaccuracies .    PLAN SUMMARY:   Ordered urinalysis for bladder cancer cells and hematuria quantification   Ordered blood test to check renal function prior to CT   Ordered CT urogram W Contrast to evaluate kidneys and bladder for potential malignancy   Scheduled cystoscopy to visualize bladder and rule out bladder cancer   Ms. Lion to practice urinating every 2-3 hours for urinary incontinence management   Provide urine sample approximately 10 days before cystoscopy appointment   Follow up for CT urogram within 1-3 weeks   Follow up on May 19th for scheduled cystoscopy    ACUTE CYSTITIS WITH HEMATURIA:   Ordered urinalysis for bladder cancer cells and quantification of hematuria.    GROSS HEMATURIA:   Ordered CT urogram W Contrast to better evaluate kidneys and bladder for  potential malignancy, as previous CT was not sufficient to rule out kidney or bladder cancer.   Follow up for CT urogram within the next 1-3 weeks.   Ordered blood test to check renal function prior to CT.   Scheduled cystoscopy to directly visualize bladder and rule out bladder cancer.   Follow up on May 19th for cystoscopy as scheduled.   Provide urine sample approximately 10 days before cystoscopy appointment.    LIFESTYLE CHANGES:   Ms. Lion to practice urinating every 2-3 hours to manage urinary incontinence.   Contact office if any concerning symptoms develop before scheduled appointments.             Portions of this note was generated with the assistance of ambient listening technology. Verbal consent was obtained by the patient and accompanying visitor(s) for the recording of patient appointment to facilitate this note. I attest to having reviewed and edited the generated note for accuracy, though some syntax or spelling errors may persist. Please contact the author of this note for any clarification.          Liza Rosado MD

## 2025-04-15 DIAGNOSIS — R31.0 GROSS HEMATURIA: Primary | ICD-10-CM

## 2025-04-15 NOTE — PROGRESS NOTES
ocedure Order to Urology [[id number]]    Electronically signed by: Liza Rosado MD on 04/10/25 0909 Status: Active   Ordering user: Liza Rosado MD 04/10/25 0909 Authorized by: Liza Rosado MD   Ordering mode: Standard   Frequency:  04/10/25 -     Diagnoses  Acute cystitis with hematuria [N30.01]   Questionnaire    Question Answer   Procedure Cystoscopy Comment - monday may 19   Facility Name: [facility], Please order Urine POCT without micro . Local sedation (no urine Dr Trevizo or Dr marshall)

## 2025-04-16 ENCOUNTER — TELEPHONE (OUTPATIENT)
Facility: CLINIC | Age: 60
End: 2025-04-16
Payer: COMMERCIAL

## 2025-04-21 ENCOUNTER — OFFICE VISIT (OUTPATIENT)
Facility: CLINIC | Age: 60
End: 2025-04-21
Payer: COMMERCIAL

## 2025-04-21 VITALS
WEIGHT: 140.38 LBS | HEART RATE: 56 BPM | BODY MASS INDEX: 25.68 KG/M2 | DIASTOLIC BLOOD PRESSURE: 57 MMHG | RESPIRATION RATE: 16 BRPM | SYSTOLIC BLOOD PRESSURE: 124 MMHG | TEMPERATURE: 98 F

## 2025-04-21 DIAGNOSIS — C50.412 MALIGNANT NEOPLASM OF UPPER-OUTER QUADRANT OF LEFT BREAST IN FEMALE, ESTROGEN RECEPTOR NEGATIVE: Primary | Chronic | ICD-10-CM

## 2025-04-21 DIAGNOSIS — Z17.1 MALIGNANT NEOPLASM OF UPPER-OUTER QUADRANT OF LEFT BREAST IN FEMALE, ESTROGEN RECEPTOR NEGATIVE: Primary | Chronic | ICD-10-CM

## 2025-04-21 PROCEDURE — G2211 COMPLEX E/M VISIT ADD ON: HCPCS | Mod: S$GLB,,, | Performed by: INTERNAL MEDICINE

## 2025-04-21 PROCEDURE — 3078F DIAST BP <80 MM HG: CPT | Mod: CPTII,S$GLB,, | Performed by: INTERNAL MEDICINE

## 2025-04-21 PROCEDURE — 99999 PR PBB SHADOW E&M-EST. PATIENT-LVL III: CPT | Mod: PBBFAC,,, | Performed by: INTERNAL MEDICINE

## 2025-04-21 PROCEDURE — 3074F SYST BP LT 130 MM HG: CPT | Mod: CPTII,S$GLB,, | Performed by: INTERNAL MEDICINE

## 2025-04-21 PROCEDURE — 1159F MED LIST DOCD IN RCRD: CPT | Mod: CPTII,S$GLB,, | Performed by: INTERNAL MEDICINE

## 2025-04-21 PROCEDURE — 99213 OFFICE O/P EST LOW 20 MIN: CPT | Mod: S$GLB,,, | Performed by: INTERNAL MEDICINE

## 2025-04-21 PROCEDURE — 3008F BODY MASS INDEX DOCD: CPT | Mod: CPTII,S$GLB,, | Performed by: INTERNAL MEDICINE

## 2025-04-21 NOTE — ASSESSMENT & PLAN NOTE
Patient is doing well and appears INDIO at this time.  Exam is negative today.  Will continue to see her on a six month basis for exams and discussed this today.  Note is made of negative abdominal MRI done in march this year.

## 2025-04-21 NOTE — PROGRESS NOTES
PROGRESS NOTE    Subjective:       Patient ID: Jordan Lion is a 59 y.o. female.    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  f/u breast cancer.     History of Present Illness:   Jordan Lion is a 59 y.o. female who presents for routine follow up of breast cancer.      Jordan is doing well today.  No new complaints. No sob, pain.  No new breast lumps/tenderness.     Family and Social history reviewed and is unchanged from 3/30/2015.       ROS:         Current Outpatient Medications:     b complex vitamins tablet, Take 1 tablet by mouth once daily., Disp: , Rfl:     gluc carvalho/chondro carvalho A/vit C/Mn (GLUCOSAMINE 1500 COMPLEX ORAL), Take by mouth., Disp: , Rfl:     vitamin D (VITAMIN D3) 1000 units Tab, Take 1,000 Units by mouth once daily., Disp: , Rfl:     predniSONE (DELTASONE) 5 MG tablet, Take 5 mg by mouth 2 (two) times daily. (Patient not taking: Reported on 4/21/2025), Disp: , Rfl:         Objective:       Physical Examination:     BP (!) 124/57   Pulse (!) 56   Temp 98.4 °F (36.9 °C)   Resp 16   Wt 63.7 kg (140 lb 6.4 oz)   LMP 11/13/2015   BMI 25.68 kg/m²     Physical Exam  Constitutional:       Appearance: Normal appearance. She is well-developed.   HENT:      Head: Normocephalic and atraumatic.      Right Ear: External ear normal.      Left Ear: External ear normal.   Eyes:      General: No scleral icterus.     Conjunctiva/sclera: Conjunctivae normal.   Neck:      Thyroid: No thyromegaly.      Trachea: No tracheal deviation.   Cardiovascular:      Rate and Rhythm: Normal rate.   Pulmonary:      Effort: Pulmonary effort is normal.   Chest:       Abdominal:      General: Abdomen is flat.   Neurological:      General: No focal deficit present.      Mental Status: She is alert and oriented to person, place, and time.       Comments: Neuro intact througout   Psychiatric:         Behavior: Behavior normal.         Thought Content: Thought content normal.         Judgment: Judgment normal.         Labs:   No results found for this or any previous visit (from the past 2 weeks).  CMP  Sodium   Date Value Ref Range Status   02/28/2025 141 136 - 145 mmol/L Final     Potassium   Date Value Ref Range Status   02/28/2025 3.9 3.5 - 5.1 mmol/L Final     Chloride   Date Value Ref Range Status   02/28/2025 108 95 - 110 mmol/L Final     CO2   Date Value Ref Range Status   02/28/2025 26 23 - 29 mmol/L Final     Glucose   Date Value Ref Range Status   02/28/2025 86 70 - 110 mg/dL Final     BUN   Date Value Ref Range Status   02/28/2025 12 6 - 20 mg/dL Final     Creatinine   Date Value Ref Range Status   02/28/2025 0.6 0.5 - 1.4 mg/dL Final     Calcium   Date Value Ref Range Status   02/28/2025 9.4 8.7 - 10.5 mg/dL Final     Total Protein   Date Value Ref Range Status   02/28/2025 7.5 6.0 - 8.4 g/dL Final     Albumin   Date Value Ref Range Status   02/28/2025 4.6 3.5 - 5.2 g/dL Final     Total Bilirubin   Date Value Ref Range Status   02/28/2025 0.3 0.1 - 1.0 mg/dL Final     Comment:     For infants and newborns, interpretation of results should be based  on gestational age, weight and in agreement with clinical  observations.    Premature Infant recommended reference ranges:  Up to 24 hours.............<8.0 mg/dL  Up to 48 hours............<12.0 mg/dL  3-5 days..................<15.0 mg/dL  6-29 days.................<15.0 mg/dL       Alkaline Phosphatase   Date Value Ref Range Status   02/28/2025 81 55 - 135 U/L Final     AST   Date Value Ref Range Status   02/28/2025 14 10 - 40 U/L Final     ALT   Date Value Ref Range Status   02/28/2025 14 10 - 44 U/L Final     Anion Gap   Date Value Ref Range Status   02/28/2025 7 (L) 8 - 16 mmol/L Final     eGFR if    Date Value Ref Range Status   01/07/2022 >60.0 >60 mL/min/1.73 m^2 Final     eGFR  "if non    Date Value Ref Range Status   01/07/2022 >60.0 >60 mL/min/1.73 m^2 Final     Comment:     Calculation used to obtain the estimated glomerular filtration  rate (eGFR) is the CKD-EPI equation.        Lab Results   Component Value Date    CEA 3.5 01/03/2024     No results found for: "PSA"        Assessment/Plan:     Problem List Items Addressed This Visit       Malignant neoplasm of upper-outer quadrant of left breast in female, estrogen receptor negative - Primary (Chronic)    Patient is doing well and appears INDIO at this time.  Exam is negative today.  Will continue to see her on a six month basis for exams and discussed this today.  Note is made of negative abdominal MRI done in march this year.                  Discussion:     Follow up in about 6 months (around 10/21/2025).      Electronically signed by Chase Colon                "

## 2025-04-22 ENCOUNTER — HOSPITAL ENCOUNTER (OUTPATIENT)
Dept: RADIOLOGY | Facility: HOSPITAL | Age: 60
Discharge: HOME | End: 2025-04-22
Attending: UROLOGY
Payer: COMMERCIAL

## 2025-04-22 DIAGNOSIS — N30.01 ACUTE CYSTITIS WITH HEMATURIA: ICD-10-CM

## 2025-04-22 PROCEDURE — 74178 CT ABD&PLV WO CNTR FLWD CNTR: CPT | Mod: TC

## 2025-04-22 PROCEDURE — 74178 CT ABD&PLV WO CNTR FLWD CNTR: CPT | Mod: 26,,, | Performed by: RADIOLOGY

## 2025-04-22 PROCEDURE — 25500020 PHARM REV CODE 255

## 2025-04-22 RX ADMIN — IOHEXOL 125 ML: 350 INJECTION, SOLUTION INTRAVENOUS at 09:04

## 2025-04-22 NOTE — PROGRESS NOTES
Please let pt know- We sent your urine to look for bladder cancer cells. This is called a urine cytology. No obvious bladder cancer cells were seen under the microscope. However this test is not very accurate and if a follow up was made (cystoscopy, clinic follow-up) please make sure to keep this.

## 2025-04-24 ENCOUNTER — TELEPHONE (OUTPATIENT)
Dept: UROLOGY | Facility: CLINIC | Age: 60
End: 2025-04-24
Payer: COMMERCIAL

## 2025-04-24 NOTE — TELEPHONE ENCOUNTER
----- Message from Vimal Marianne sent at 4/23/2025  4:05 PM CDT -----    ----- Message -----  From: Liza Rosado MD  Sent: 4/23/2025   3:21 PM CDT  To: [facility] Staff    Please see if this pt wants to move cysto up to may 12 insteadMove urine sample If needed as well

## 2025-04-29 NOTE — PROGRESS NOTES
Nothing to explain blood in the urine      Has tiny cyst R kd  2-3mm hypodensity R kd  Multiple uterine fibroids

## 2025-05-06 ENCOUNTER — LAB VISIT (OUTPATIENT)
Dept: LAB | Facility: HOSPITAL | Age: 60
End: 2025-05-06
Attending: UROLOGY
Payer: COMMERCIAL

## 2025-05-06 DIAGNOSIS — R31.0 GROSS HEMATURIA: ICD-10-CM

## 2025-05-06 DIAGNOSIS — N30.01 ACUTE CYSTITIS WITH HEMATURIA: ICD-10-CM

## 2025-05-06 LAB
BILIRUB UR QL STRIP.AUTO: NEGATIVE
CLARITY UR: CLEAR
COLOR UR AUTO: COLORLESS
GLUCOSE UR QL STRIP: NEGATIVE
HGB UR QL STRIP: NEGATIVE
KETONES UR QL STRIP: NEGATIVE
LEUKOCYTE ESTERASE UR QL STRIP: NEGATIVE
NITRITE UR QL STRIP: NEGATIVE
PH UR STRIP: 7 [PH]
PROT UR QL STRIP: NEGATIVE
SP GR UR STRIP: 1.02
UROBILINOGEN UR STRIP-ACNC: NEGATIVE EU/DL

## 2025-05-06 PROCEDURE — 81003 URINALYSIS AUTO W/O SCOPE: CPT

## 2025-05-12 ENCOUNTER — PATIENT MESSAGE (OUTPATIENT)
Dept: UROLOGY | Facility: CLINIC | Age: 60
End: 2025-05-12

## 2025-05-12 ENCOUNTER — HOSPITAL ENCOUNTER (OUTPATIENT)
Facility: HOSPITAL | Age: 60
Discharge: HOME | End: 2025-05-12
Attending: UROLOGY | Admitting: UROLOGY
Payer: COMMERCIAL

## 2025-05-12 VITALS
OXYGEN SATURATION: 99 % | WEIGHT: 140.44 LBS | TEMPERATURE: 98 F | BODY MASS INDEX: 25.84 KG/M2 | SYSTOLIC BLOOD PRESSURE: 161 MMHG | RESPIRATION RATE: 16 BRPM | HEIGHT: 62 IN | DIASTOLIC BLOOD PRESSURE: 77 MMHG | HEART RATE: 58 BPM

## 2025-05-12 DIAGNOSIS — R31.0 GROSS HEMATURIA: ICD-10-CM

## 2025-05-12 DIAGNOSIS — N35.82 OTHER STRICTURE OF URETHRA IN FEMALE: Primary | ICD-10-CM

## 2025-05-12 LAB
BILIRUBIN, POC UA: NEGATIVE
BLOOD, POC UA: ABNORMAL
CLARITY, UA: CLEAR
COLOR, UA: YELLOW
GLUCOSE, POC UA: NEGATIVE
KETONES, POC UA: NEGATIVE
LEUKOCYTE EST, POC UA: NEGATIVE
NITRITE, POC UA: NEGATIVE
PH UR STRIP: 6 [PH] (ref 5–8)
PROTEIN, POC UA: NEGATIVE
SPECIFIC GRAVITY, POC UA: 1.01 (ref 1–1.03)
UROBILINOGEN, POC UA: 0.2 E.U./DL

## 2025-05-12 PROCEDURE — 52000 CYSTOURETHROSCOPY: CPT | Performed by: UROLOGY

## 2025-05-12 PROCEDURE — 25000003 PHARM REV CODE 250: Performed by: UROLOGY

## 2025-05-12 PROCEDURE — A4217 STERILE WATER/SALINE, 500 ML: HCPCS | Performed by: UROLOGY

## 2025-05-12 PROCEDURE — 52000 CYSTOURETHROSCOPY: CPT | Mod: ,,, | Performed by: UROLOGY

## 2025-05-12 RX ORDER — CIPROFLOXACIN 500 MG/1
500 TABLET, FILM COATED ORAL ONCE
Status: COMPLETED | OUTPATIENT
Start: 2025-05-12 | End: 2025-05-12

## 2025-05-12 RX ORDER — LIDOCAINE HYDROCHLORIDE 20 MG/ML
JELLY TOPICAL
Status: DISCONTINUED | OUTPATIENT
Start: 2025-05-12 | End: 2025-05-12 | Stop reason: HOSPADM

## 2025-05-12 RX ORDER — WATER 1 ML/ML
INJECTION IRRIGATION
Status: DISCONTINUED | OUTPATIENT
Start: 2025-05-12 | End: 2025-05-12 | Stop reason: HOSPADM

## 2025-05-12 RX ORDER — ESTRADIOL 0.1 MG/G
1 CREAM VAGINAL
Qty: 42.5 G | Refills: 3 | Status: SHIPPED | OUTPATIENT
Start: 2025-05-12 | End: 2026-05-12

## 2025-05-12 RX ADMIN — CIPROFLOXACIN 500 MG: 500 TABLET ORAL at 02:05

## 2025-05-12 NOTE — DISCHARGE INSTRUCTIONS
Your urologist is: Dr.Jennifer Rosado  Office number: [phone]  Address: [address] [place] [address], [address], [place] [address]      PLEASE READ the following directions and contact our office with any questions via phone or the portal. If you were told that you need an appointment and no appointment was made, call the office the day after surgery and ask to speak with 's nurse to make an appointment.    [facility] Procedure Note- [facility]  Date: 05/12/2025    Procedure:   Flexible cysto-uretheroscopy     Pre Procedure Diagnosis:   1. Gross hematuria        Post Procedure Diagnosis: same, see below for findings    Surgeon: Liza Rosado MD    Specimen: none    Anesthesia: 2% uro-jet lidocaine jelly for local analgesia    Indications: Jordan Lion is a 59 y.o. female  With above pre-procedure diagnosis. Urine reviewed. H&P reviewed.     Procedure in detail:   Flexible cysto-urethroscopy was performed after consent was obtained.  The risks and benefits were explained.    2% lidocaine urojet was used for local analgesia.  The genitalia was prepped and draped in the sterile fashion with betadine.    The flexible scope was advanced into the urethra and into the bladder.  Bilateral ureteral orifice were evaluated and noted to be normal with clear efflux.  The bladder was completely surveyed in a systematic fashion and the scope was retroflexed.    Cystoscopy findings as below in findings.   No strictures were noted.     The patient tolerated the procedure well without complication.    Date: 5/12/25, Procedure:  Flexible cystourethroscopy   Cystoscopy Findings :  Tight urethral meatus but able to pass scope. Bladder neck polyps (b9). Normal bladder mucosa (no tumors or lesions).     Bladder neck polyps          Urethral meatus        Assesment: Jordan Lion is a 59 y.o. female with   1. Gross hematuria      She does have fibroids and suspect her gross hematuria could have been vaginal actually  caused by the fibroids or could have been caused by her e.coli uti's she had.     Plan:   If she has bloody urine in the future should call for an appt with gyn or urology and have speculum exam to confirm blood not coming from vagina and catheterized urine sample to check for a uti.   If unable to get urgent appt can go to urgent care to be evaluated. Or can do a tampon test.   Place tampon in vagina at the time of bleeding and if blood seen on tampon then bleeding likely more vaginal (from fibroids) than from bladder.   Fu with gynecology for h/o fibroids (non urgent)  For her tight urethral meatus can apply estrace cream (has h/o breast cancer so only apply pea sized amount to finger then dab urethra only).   Do this nightly for two weeks then every other night. If she starts to have any trouble urinating (frequency, slow flow, straining to urinate) may need to confirm she doesn't have recurrence of stricture/tightness in meatus (by placing a catheter).  Can use [employer] coupon for estrace if too $$$  Cipro x 1 in recovery  Fu in 6 months with urology -  needed    Liza Rosado MD        Post Cystoscopy Instructions    What to Expect After a Cystoscopy  For the next 24-48 hours, you may feel a mild burning when you urinate. This burning is normal and expected. Drink plenty of water to dilute the urine to help relieve the burning sensation. You may also see a small amount of blood in your urine after the procedure.    Unless you are already taking antibiotics, you may be given an antibiotic after the test to prevent infection.    Signs and Symptoms to Report  Call the [facility] at [phone] if you develop any of the following   Fever of 101 degrees or higher  Chills or persistent bleeding  Inability to urinate       After the procedure    Drink plenty of fluids.  You may have burning or light bleeding when you urinate--this is normal.  Medications may be prescribed to ease any  discomfort or prevent infection. Take these as directed.  Call your doctor if you have heavy bleeding or blood clots, burning that lasts more than a day, a fever over 100°F  (38° C), or trouble urinating.    After Surgery:  Always be aware that any surgery can cause these symptoms:    Pain- Medication can be prescribed for pain to decrease your pain but may not completely take your pain away.  Over the Counter pain medicine my be enough and you can always use Ice and rest to help ease pain.    Bleeding- a little bleeding after a surgery is usually within normal.  If there is a lot of blood you need to notify your MD.  Emergency treatments of bleeding are cold application, elevation of the bleeding site and compression.    Infection- Infection after surgery is NOT a normal occurrence.  Signs of infection are fever, swelling, hot to touch the incision.  If this occurs notify your MD immediately.    Nausea- this can be common after a surgery especially if you have had anesthesia medicine or are taking pain medicine.  Staying on clear liquids, bland foods, gingerale, or over the counter anti nausea medicines can help.  If you vomit more than once, notify your MD.  Anti Nausea medicines can be prescribed.

## 2025-05-12 NOTE — OP NOTE
[facility] Procedure Note- [facility]  Date: 05/12/2025    Procedure:   Flexible cysto-uretheroscopy     Pre Procedure Diagnosis:   1. Gross hematuria        Post Procedure Diagnosis: same, see below for findings    Surgeon: Liza Rosado MD    Specimen: none    Anesthesia: 2% uro-jet lidocaine jelly for local analgesia    Indications: Jordan Lion is a 59 y.o. female  With above pre-procedure diagnosis. Urine reviewed. H&P reviewed.     Procedure in detail:   Flexible cysto-urethroscopy was performed after consent was obtained.  The risks and benefits were explained.    2% lidocaine urojet was used for local analgesia.  The genitalia was prepped and draped in the sterile fashion with betadine.    The flexible scope was advanced into the urethra and into the bladder.  Bilateral ureteral orifice were evaluated and noted to be normal with clear efflux.  The bladder was completely surveyed in a systematic fashion and the scope was retroflexed.    Cystoscopy findings as below in findings.   No strictures were noted.     The patient tolerated the procedure well without complication.    Date: 5/12/25, Procedure:  Flexible cystourethroscopy   Cystoscopy Findings :  Tight urethral meatus but able to pass scope. Bladder neck polyps (b9). Normal bladder mucosa (no tumors or lesions).     Bladder neck polyps          Urethral meatus        Assesment: Jordan Lion is a 59 y.o. female with   1. Gross hematuria      She does have fibroids and suspect her gross hematuria could have been vaginal actually caused by the fibroids or could have been caused by her e.coli uti's she had.     Plan:   If she has bloody urine in the future should call for an appt with gyn or urology and have speculum exam to confirm blood not coming from vagina and catheterized urine sample to check for a uti.   If unable to get urgent appt can go to urgent care to be evaluated. Or can do a tampon test.   Place tampon in vagina at the time of bleeding  and if blood seen on tampon then bleeding likely more vaginal (from fibroids) than from bladder.   Fu with gynecology for h/o fibroids (non urgent)  For her tight urethral meatus can apply estrace cream (has h/o breast cancer so only apply pea sized amount to finger then dab urethra only).   Do this nightly for two weeks then every other night. If she starts to have any trouble urinating (frequency, slow flow, straining to urinate) may need to confirm she doesn't have recurrence of stricture/tightness in meatus (by placing a catheter).  Can use [employer] coupon for estrace if too $$$  Cipro x 1 in recovery  Fu in 6 months with urology -  needed    Liza Rosado MD

## 2025-05-12 NOTE — DISCHARGE SUMMARY
[facility] Peri Services  Urology  Discharge Note - Short Stay      Patient Name: Jordan Lion  MRN: [id number]  Discharge Date and Time: 05/12/2025 2:29 PM  Attending Physician: Liza Rosado.*   Discharging Provider: Liza Rosado MD  Primary Care Physician: Brayan Devlin MD    There are no hospital problems to display for this patient.      Final Diagnoses: Same as principal problem.    Hospital Course: Patient was admitted for an outpatient procedure and tolerated the procedure well with no complications.*    Procedure(s) (LRB):  CYSTOSCOPY (N/A)     Indwelling Lines/Drains at time of discharge:   Lines/Drains/Airways       None                   Discharged Condition: good    Disposition: home    Follow Up:      Patient Instructions:   No discharge procedures on file.    Medications:  Reconciled Home Medications:      Medication List        START taking these medications      estradioL 0.01 % (0.1 mg/gram) vaginal cream  Commonly known as: ESTRACE  Place 1 g vaginally twice a week. Apply 0.25g to urethral meatus twice a week            CONTINUE taking these medications      b complex vitamins tablet  Take 1 tablet by mouth once daily.     GLUCOSAMINE 1500 COMPLEX ORAL  Take by mouth.     vitamin D 1000 units Tab  Commonly known as: VITAMIN D3  Take 1,000 Units by mouth once daily.            ASK your doctor about these medications      predniSONE 5 MG tablet  Commonly known as: DELTASONE  Take 5 mg by mouth 2 (two) times daily.              Discharge Procedure Orders (must include Diet, Follow-up, Activity):  No discharge procedures on file.     Assesment: Jordan Lion is a 59 y.o. female with   1. Gross hematuria      She does have fibroids and suspect her gross hematuria could have been vaginal actually caused by the fibroids or could have been caused by her e.coli uti's she had.     Plan:   If she has bloody urine in the future should call for an appt with gyn or urology and  have speculum exam to confirm blood not coming from vagina and catheterized urine sample to check for a uti.   If unable to get urgent appt can go to urgent care to be evaluated. Or can do a tampon test.   Place tampon in vagina at the time of bleeding and if blood seen on tampon then bleeding likely more vaginal (from fibroids) than from bladder.   Fu with gynecology for h/o fibroids (non urgent)  For her tight urethral meatus can apply estrace cream (has h/o breast cancer so only apply pea sized amount to finger then dab urethra only).   Do this nightly for two weeks then every other night. If she starts to have any trouble urinating (frequency, slow flow, straining to urinate) may need to confirm she doesn't have recurrence of stricture/tightness in meatus (by placing a catheter).  Can use [employer] coupon for estrace if too $$$  Cipro x 1 in recovery  Fu in 6 months with urology -  needed    Liza Rosado MD  Urology  [facility] - Periop Services

## 2025-05-12 NOTE — H&P
[facility]  Group MD's:lAonzo/Santhosh/Court/Ellen  Group NP's: Lety De Los Santos, Alison Hammond    PCP: Brayan Devlin MD  Date of Service: 05/12/2025  Today's note written by: Liza Rosado md      Subjective:     HPI: Jordan Lion is a 59 y.o. female     Initial consult by me in clinic on 4/10/25 :  Ms. Lion reports two emergency room visits on February 20th and 27th for urinary tract infections. During the first episode on February 20th, she had severe hematuria, describing it as significant menstrual-like bleeding, along with urinary frequency. Ms. Lion was prescribed Macrobid (nitrofurantoin) for 5 days, which initially resolved her symptoms. Her symptoms returned within a few days, prompting a second ER visit on February 27th.  The second episode involved similar but less severe symptoms, including hematuria and dysuria. She was prescribed antibiotics for 7 days, taken 3-4 times daily. After completing this course, she contacted her primary care physician, Dr. Devlin, who provided additional antibiotics. Her symptoms resolved quickly with this treatment.  She underwent a CT on February 28th, which showed bladder wall thickening and a 1-2 cm liver lesion (later determined to be a hemangioma on MRI). She denies current hematuria but reports occasionally feeling unusual sensations that she has difficulty describing specifically. She clarifies it is not pain in her bladder.  She also reports occasional stress incontinence, particularly when coughing, laughing hard, or exercising. She urinates every 1-4 hours, depending on her fluid intake.  She denies fever or flank pain.      My notes:  Personal hx of breast cancer  Worked in [employer] - 8 years   C/o harleen - not that bad  No c/o oab    Interval history/H&P Update by me/ prior to cystoscopy on 5/12/25:    Here today for cysto for GH  Had a ctu 4/22/25 which showed Has tiny cyst R  kd, 2-3mm hypodensity R kd and Multiple uterine fibroids  Cytology 4/10/25 neg.  Here today to complete santiago with cysto  Ua 5/6/25 neg  Ua today tr bld    Ctu 4/22/25  Re-demonstration of small hypodensity liver characterized as hemangioma on MRI of 03/28/2025 without significant change.  Tiny 2-3 mm hypodensity right kidney suggesting cyst with otherwise unremarkable appearance of the kidneys.   Multiple uterine fibroids.           Urine history: family history of kidney, bladder or prostate cancer:No, personal or family history of kidney stones: No,tobacco use: No, anticoagulation: No  5/6/25  neg  4/10/25 Tr bld  2/27/25 E.coli, void:3+bld/nit+/3+leuk, 6 rbc/40 wbc/occ bact  2/20/25 E.coli, void: red/3+bld/3+leuk,>100 rbc/>100 wbc/few bact      REVIEW OF SYSTEMS:  Negative except for as stated above    Past Medical History:   Diagnosis Date    Breast cancer     Cancer     breast left     PONV (postoperative nausea and vomiting)        Past Surgical History:   Procedure Laterality Date    BREAST SURGERY      MASTECTOMY Bilateral     lymph nodes from left arm with tissue expander            Objective:     Vitals:    05/12/25 1353   BP: (!) 156/72   Pulse: 61   Resp: 18         General:wdwn  in NAD  Neurologic: CN grossly normal. Normal sensation.   Psychiatric: awake, alert and oriented x 3. Mood and affect Normal. Cooperative.  Eyes: PERRLA, normal conjunctiva  Respiratory: no increased work on breathing. No wheezing.   Cardiovascular: No obvious extremity edema. Warm and well perfused.  GI: no obvious stomach distension  Musculoskeletal: normal  range of motion of bilateral upper extremities. Normal muscle strength and tone.  Skin: no obvious rashes or lesions. No tightening of skin noted.    Pertinent  exam in HPI    Recent Labs   Lab 11/30/23  0756 02/28/25  0104   WBC 5.09 10.93   Hemoglobin 12.7 13.1   Hematocrit 39.0 39.6   Platelets 246 338   ]  Recent Labs   Lab 05/31/24  0933 12/09/24  0823 02/28/25  0104  "04/22/25  0818   Sodium 142 142 141  --    Potassium 4.1 4.3 3.9  --    Chloride 109 107 108  --    CO2 26 29 26  --    BUN 14 15 12  --    Creatinine 0.6 0.7 0.6 0.6   Glucose 87 90 86  --    Calcium 9.2 9.2 9.4  --    Alkaline Phosphatase 78 78 81  --    Total Protein 7.0 7.4 7.5  --    Albumin 4.3 4.6 4.6  --    Total Bilirubin 0.5 0.6 0.3  --    AST 16 18 14  --    ALT 14 18 14  --    ]    No results found for: "LABA1C", "HGBA1C"        Assessment:     Jordan Lion is a 59 y.o. female with     1. Gross hematuria          Plan:        I have explained the risks including hematuria and UTI, benefits, and alternatives of the procedure in detail.  The patient voices understanding and all questions have been answered.  A verbal and written consent was obtained. The patient agrees to proceed as planned.    This patient has been cleared for surgery in ambulatory surgical facility.                         Liza Rosado MD      "

## 2025-05-13 ENCOUNTER — TELEPHONE (OUTPATIENT)
Dept: UROLOGY | Facility: CLINIC | Age: 60
End: 2025-05-13
Payer: COMMERCIAL

## 2025-05-13 NOTE — TELEPHONE ENCOUNTER
----- Message from Vimal Marianne sent at 5/12/2025  4:34 PM CDT -----    ----- Message -----  From: Liza Rosado MD  Sent: 5/12/2025   4:34 PM CDT  To: [facility]      ----- Message -----  From: Marianne Strauss  Sent: 5/12/2025   3:45 PM CDT  To: Liza Rosado MD      ----- Message -----  From: Liza Rosado MD  Sent: 5/12/2025   2:27 PM CDT  To: [facility] Staff    · Fu in 6 months with urology -  needed

## (undated) DEVICE — KIT ENDOKIT EGD/COLON/ERCP

## (undated) DEVICE — UNDERPAD ULTRASORB 300LB 30X36

## (undated) DEVICE — SET CYSTO IRR DRP CHMBR 84IN

## (undated) DEVICE — GLOVE SENSICARE PI ALOE 6